# Patient Record
Sex: MALE | Race: WHITE | NOT HISPANIC OR LATINO | Employment: FULL TIME | URBAN - METROPOLITAN AREA
[De-identification: names, ages, dates, MRNs, and addresses within clinical notes are randomized per-mention and may not be internally consistent; named-entity substitution may affect disease eponyms.]

---

## 2023-10-16 ENCOUNTER — LAB (OUTPATIENT)
Dept: LAB | Facility: LAB | Age: 63
End: 2023-10-16
Payer: COMMERCIAL

## 2023-10-16 DIAGNOSIS — I10 ESSENTIAL (PRIMARY) HYPERTENSION: ICD-10-CM

## 2023-10-16 DIAGNOSIS — R73.09 OTHER ABNORMAL GLUCOSE: ICD-10-CM

## 2023-10-16 DIAGNOSIS — Z00.00 ENCOUNTER FOR GENERAL ADULT MEDICAL EXAMINATION WITHOUT ABNORMAL FINDINGS: Primary | ICD-10-CM

## 2023-10-16 DIAGNOSIS — Z12.5 ENCOUNTER FOR SCREENING FOR MALIGNANT NEOPLASM OF PROSTATE: ICD-10-CM

## 2023-10-16 LAB
EST. AVERAGE GLUCOSE BLD GHB EST-MCNC: 151 MG/DL
HBA1C MFR BLD: 6.9 %
PSA SERPL-MCNC: 0.47 NG/ML

## 2023-10-16 PROCEDURE — 84153 ASSAY OF PSA TOTAL: CPT

## 2023-10-16 PROCEDURE — 36415 COLL VENOUS BLD VENIPUNCTURE: CPT

## 2023-10-16 PROCEDURE — 83036 HEMOGLOBIN GLYCOSYLATED A1C: CPT

## 2023-10-17 ENCOUNTER — HOSPITAL ENCOUNTER (OUTPATIENT)
Dept: RADIOLOGY | Facility: HOSPITAL | Age: 63
End: 2023-10-17
Payer: COMMERCIAL

## 2023-10-24 ENCOUNTER — APPOINTMENT (OUTPATIENT)
Dept: VASCULAR MEDICINE | Facility: HOSPITAL | Age: 63
End: 2023-10-24
Payer: COMMERCIAL

## 2023-10-31 ENCOUNTER — APPOINTMENT (OUTPATIENT)
Dept: RADIOLOGY | Facility: HOSPITAL | Age: 63
End: 2023-10-31
Payer: COMMERCIAL

## 2023-11-02 ENCOUNTER — HOSPITAL ENCOUNTER (OUTPATIENT)
Dept: VASCULAR MEDICINE | Facility: HOSPITAL | Age: 63
Discharge: HOME | End: 2023-11-02
Payer: COMMERCIAL

## 2023-11-02 DIAGNOSIS — R60.0 LOCALIZED EDEMA: ICD-10-CM

## 2023-11-02 DIAGNOSIS — I87.2 VENOUS INSUFFICIENCY (CHRONIC) (PERIPHERAL): ICD-10-CM

## 2023-11-02 PROCEDURE — 93971 EXTREMITY STUDY: CPT | Performed by: SURGERY

## 2023-11-02 PROCEDURE — 93971 EXTREMITY STUDY: CPT

## 2024-10-15 ENCOUNTER — LAB (OUTPATIENT)
Dept: LAB | Facility: LAB | Age: 64
End: 2024-10-15
Payer: COMMERCIAL

## 2024-10-15 DIAGNOSIS — R63.4 ABNORMAL WEIGHT LOSS: ICD-10-CM

## 2024-10-15 DIAGNOSIS — R05.3 CHRONIC COUGH: ICD-10-CM

## 2024-10-15 DIAGNOSIS — R10.84 GENERALIZED ABDOMINAL PAIN: ICD-10-CM

## 2024-10-15 LAB
CREAT SERPL-MCNC: 0.71 MG/DL (ref 0.5–1.3)
EGFRCR SERPLBLD CKD-EPI 2021: >90 ML/MIN/1.73M*2

## 2024-10-15 PROCEDURE — 36415 COLL VENOUS BLD VENIPUNCTURE: CPT

## 2024-10-15 PROCEDURE — 82565 ASSAY OF CREATININE: CPT

## 2024-10-16 ENCOUNTER — HOSPITAL ENCOUNTER (OUTPATIENT)
Dept: RADIOLOGY | Facility: HOSPITAL | Age: 64
Discharge: HOME | End: 2024-10-16
Payer: COMMERCIAL

## 2024-10-16 DIAGNOSIS — R63.4 ABNORMAL WEIGHT LOSS: ICD-10-CM

## 2024-10-16 DIAGNOSIS — R05.3 CHRONIC COUGH: ICD-10-CM

## 2024-10-16 DIAGNOSIS — R10.84 GENERALIZED ABDOMINAL PAIN: ICD-10-CM

## 2024-10-16 PROCEDURE — 71260 CT THORAX DX C+: CPT | Performed by: STUDENT IN AN ORGANIZED HEALTH CARE EDUCATION/TRAINING PROGRAM

## 2024-10-16 PROCEDURE — 74177 CT ABD & PELVIS W/CONTRAST: CPT | Performed by: STUDENT IN AN ORGANIZED HEALTH CARE EDUCATION/TRAINING PROGRAM

## 2024-10-16 PROCEDURE — 2550000001 HC RX 255 CONTRASTS

## 2024-10-16 PROCEDURE — 74177 CT ABD & PELVIS W/CONTRAST: CPT

## 2024-11-01 ENCOUNTER — HOSPITAL ENCOUNTER (OUTPATIENT)
Dept: RADIOLOGY | Facility: HOSPITAL | Age: 64
Discharge: HOME | End: 2024-11-01
Payer: COMMERCIAL

## 2024-11-01 ENCOUNTER — LAB (OUTPATIENT)
Dept: LAB | Facility: LAB | Age: 64
End: 2024-11-01
Payer: COMMERCIAL

## 2024-11-01 DIAGNOSIS — R63.4 ABNORMAL WEIGHT LOSS: Primary | ICD-10-CM

## 2024-11-01 DIAGNOSIS — N28.9 DISORDER OF KIDNEY AND URETER, UNSPECIFIED: ICD-10-CM

## 2024-11-01 DIAGNOSIS — R63.4 ABNORMAL WEIGHT LOSS: ICD-10-CM

## 2024-11-01 DIAGNOSIS — R10.84 GENERALIZED ABDOMINAL PAIN: ICD-10-CM

## 2024-11-01 LAB
ALBUMIN SERPL BCP-MCNC: 4 G/DL (ref 3.4–5)
ALP SERPL-CCNC: 51 U/L (ref 33–136)
ALT SERPL W P-5'-P-CCNC: 12 U/L (ref 10–52)
ANION GAP SERPL CALC-SCNC: 12 MMOL/L (ref 10–20)
AST SERPL W P-5'-P-CCNC: 14 U/L (ref 9–39)
BASOPHILS # BLD AUTO: 0.03 X10*3/UL (ref 0–0.1)
BASOPHILS NFR BLD AUTO: 0.4 %
BILIRUB SERPL-MCNC: 1 MG/DL (ref 0–1.2)
BUN SERPL-MCNC: 12 MG/DL (ref 6–23)
CALCIUM SERPL-MCNC: 8.9 MG/DL (ref 8.6–10.3)
CHLORIDE SERPL-SCNC: 103 MMOL/L (ref 98–107)
CO2 SERPL-SCNC: 29 MMOL/L (ref 21–32)
CREAT SERPL-MCNC: 0.69 MG/DL (ref 0.5–1.3)
EGFRCR SERPLBLD CKD-EPI 2021: >90 ML/MIN/1.73M*2
EOSINOPHIL # BLD AUTO: 0.09 X10*3/UL (ref 0–0.7)
EOSINOPHIL NFR BLD AUTO: 1.3 %
ERYTHROCYTE [DISTWIDTH] IN BLOOD BY AUTOMATED COUNT: 14.5 % (ref 11.5–14.5)
EST. AVERAGE GLUCOSE BLD GHB EST-MCNC: 131 MG/DL
GLUCOSE SERPL-MCNC: 118 MG/DL (ref 74–99)
HBA1C MFR BLD: 6.2 %
HCT VFR BLD AUTO: 45.2 % (ref 41–52)
HGB BLD-MCNC: 15 G/DL (ref 13.5–17.5)
IMM GRANULOCYTES # BLD AUTO: 0.02 X10*3/UL (ref 0–0.7)
IMM GRANULOCYTES NFR BLD AUTO: 0.3 % (ref 0–0.9)
LYMPHOCYTES # BLD AUTO: 1.21 X10*3/UL (ref 1.2–4.8)
LYMPHOCYTES NFR BLD AUTO: 17.8 %
MCH RBC QN AUTO: 30.9 PG (ref 26–34)
MCHC RBC AUTO-ENTMCNC: 33.2 G/DL (ref 32–36)
MCV RBC AUTO: 93 FL (ref 80–100)
MONOCYTES # BLD AUTO: 0.72 X10*3/UL (ref 0.1–1)
MONOCYTES NFR BLD AUTO: 10.6 %
NEUTROPHILS # BLD AUTO: 4.72 X10*3/UL (ref 1.2–7.7)
NEUTROPHILS NFR BLD AUTO: 69.6 %
NRBC BLD-RTO: 0 /100 WBCS (ref 0–0)
PLATELET # BLD AUTO: 232 X10*3/UL (ref 150–450)
POTASSIUM SERPL-SCNC: 4.8 MMOL/L (ref 3.5–5.3)
PROT SERPL-MCNC: 6.5 G/DL (ref 6.4–8.2)
RBC # BLD AUTO: 4.86 X10*6/UL (ref 4.5–5.9)
SODIUM SERPL-SCNC: 139 MMOL/L (ref 136–145)
T4 FREE SERPL-MCNC: 0.84 NG/DL (ref 0.61–1.12)
TSH SERPL-ACNC: 2.27 MIU/L (ref 0.44–3.98)
WBC # BLD AUTO: 6.8 X10*3/UL (ref 4.4–11.3)

## 2024-11-01 PROCEDURE — 85025 COMPLETE CBC W/AUTO DIFF WBC: CPT

## 2024-11-01 PROCEDURE — 83036 HEMOGLOBIN GLYCOSYLATED A1C: CPT

## 2024-11-01 PROCEDURE — 84439 ASSAY OF FREE THYROXINE: CPT

## 2024-11-01 PROCEDURE — 84443 ASSAY THYROID STIM HORMONE: CPT

## 2024-11-01 PROCEDURE — 36415 COLL VENOUS BLD VENIPUNCTURE: CPT

## 2024-11-01 PROCEDURE — 80053 COMPREHEN METABOLIC PANEL: CPT

## 2024-11-01 PROCEDURE — 76770 US EXAM ABDO BACK WALL COMP: CPT

## 2024-12-06 ENCOUNTER — HOSPITAL ENCOUNTER (OUTPATIENT)
Dept: RADIOLOGY | Facility: HOSPITAL | Age: 64
Discharge: HOME | End: 2024-12-06
Payer: COMMERCIAL

## 2024-12-06 DIAGNOSIS — N28.9 DISORDER OF KIDNEY AND URETER, UNSPECIFIED: ICD-10-CM

## 2024-12-06 PROCEDURE — A9575 INJ GADOTERATE MEGLUMI 0.1ML: HCPCS

## 2024-12-06 PROCEDURE — 2550000001 HC RX 255 CONTRASTS

## 2024-12-06 PROCEDURE — 74183 MRI ABD W/O CNTR FLWD CNTR: CPT

## 2024-12-06 RX ORDER — GADOTERATE MEGLUMINE 376.9 MG/ML
0.2 INJECTION INTRAVENOUS
Status: COMPLETED | OUTPATIENT
Start: 2024-12-06 | End: 2024-12-06

## 2025-01-13 ENCOUNTER — APPOINTMENT (OUTPATIENT)
Dept: RADIOLOGY | Facility: HOSPITAL | Age: 65
End: 2025-01-13
Payer: COMMERCIAL

## 2025-01-13 ENCOUNTER — APPOINTMENT (OUTPATIENT)
Dept: CARDIOLOGY | Facility: HOSPITAL | Age: 65
End: 2025-01-13
Payer: COMMERCIAL

## 2025-01-13 ENCOUNTER — HOSPITAL ENCOUNTER (INPATIENT)
Facility: HOSPITAL | Age: 65
LOS: 1 days | Discharge: HOME | End: 2025-01-14
Attending: EMERGENCY MEDICINE | Admitting: INTERNAL MEDICINE
Payer: COMMERCIAL

## 2025-01-13 DIAGNOSIS — R41.82 ALTERED MENTAL STATUS, UNSPECIFIED ALTERED MENTAL STATUS TYPE: ICD-10-CM

## 2025-01-13 DIAGNOSIS — G45.8 OTHER TRANSIENT CEREBRAL ISCHEMIC ATTACKS AND RELATED SYNDROMES: ICD-10-CM

## 2025-01-13 DIAGNOSIS — I10 ESSENTIAL (PRIMARY) HYPERTENSION: ICD-10-CM

## 2025-01-13 DIAGNOSIS — I16.1 HYPERTENSIVE EMERGENCY: Primary | ICD-10-CM

## 2025-01-13 PROBLEM — R55 SYNCOPE AND COLLAPSE: Status: ACTIVE | Noted: 2022-02-03

## 2025-01-13 LAB
ALBUMIN SERPL BCP-MCNC: 4.1 G/DL (ref 3.4–5)
ALP SERPL-CCNC: 50 U/L (ref 33–136)
ALT SERPL W P-5'-P-CCNC: 17 U/L (ref 10–52)
AMPHETAMINES UR QL SCN: ABNORMAL
ANION GAP BLDV CALCULATED.4IONS-SCNC: 7 MMOL/L (ref 10–25)
ANION GAP SERPL CALC-SCNC: 12 MMOL/L (ref 10–20)
AORTIC VALVE MEAN GRADIENT: 4 MMHG
AORTIC VALVE PEAK VELOCITY: 1.49 M/S
APAP SERPL-MCNC: <10 UG/ML
APPEARANCE UR: CLEAR
AST SERPL W P-5'-P-CCNC: 22 U/L (ref 9–39)
ATRIAL RATE: 92 BPM
AV PEAK GRADIENT: 9 MMHG
AVA (PEAK VEL): 3.69 CM2
AVA (VTI): 3.53 CM2
BARBITURATES UR QL SCN: ABNORMAL
BASE EXCESS BLDV CALC-SCNC: 4.9 MMOL/L (ref -2–3)
BASOPHILS # BLD AUTO: 0.01 X10*3/UL (ref 0–0.1)
BASOPHILS NFR BLD AUTO: 0.2 %
BENZODIAZ UR QL SCN: ABNORMAL
BILIRUB DIRECT SERPL-MCNC: 0.2 MG/DL (ref 0–0.3)
BILIRUB SERPL-MCNC: 0.8 MG/DL (ref 0–1.2)
BILIRUB UR STRIP.AUTO-MCNC: NEGATIVE MG/DL
BODY TEMPERATURE: 37 DEGREES CELSIUS
BUN SERPL-MCNC: 12 MG/DL (ref 6–23)
BZE UR QL SCN: ABNORMAL
CA-I BLDV-SCNC: 1.16 MMOL/L (ref 1.1–1.33)
CALCIUM SERPL-MCNC: 8.9 MG/DL (ref 8.6–10.3)
CANNABINOIDS UR QL SCN: ABNORMAL
CARDIAC TROPONIN I PNL SERPL HS: 13 NG/L (ref 0–20)
CARDIAC TROPONIN I PNL SERPL HS: 16 NG/L (ref 0–20)
CHLORIDE BLDV-SCNC: 104 MMOL/L (ref 98–107)
CHLORIDE SERPL-SCNC: 102 MMOL/L (ref 98–107)
CO2 SERPL-SCNC: 28 MMOL/L (ref 21–32)
COLOR UR: ABNORMAL
CREAT SERPL-MCNC: 0.84 MG/DL (ref 0.5–1.3)
EGFRCR SERPLBLD CKD-EPI 2021: >90 ML/MIN/1.73M*2
EJECTION FRACTION APICAL 4 CHAMBER: 62.8
EJECTION FRACTION: 63 %
EOSINOPHIL # BLD AUTO: 0.02 X10*3/UL (ref 0–0.7)
EOSINOPHIL NFR BLD AUTO: 0.4 %
ERYTHROCYTE [DISTWIDTH] IN BLOOD BY AUTOMATED COUNT: 14.5 % (ref 11.5–14.5)
ETHANOL SERPL-MCNC: <10 MG/DL
FENTANYL+NORFENTANYL UR QL SCN: ABNORMAL
FLUAV RNA RESP QL NAA+PROBE: DETECTED
FLUBV RNA RESP QL NAA+PROBE: NOT DETECTED
GLUCOSE BLDV-MCNC: 160 MG/DL (ref 74–99)
GLUCOSE SERPL-MCNC: 202 MG/DL (ref 74–99)
GLUCOSE UR STRIP.AUTO-MCNC: ABNORMAL MG/DL
HCO3 BLDV-SCNC: 28.1 MMOL/L (ref 22–26)
HCT VFR BLD AUTO: 43.1 % (ref 41–52)
HCT VFR BLD EST: 44 % (ref 41–52)
HGB BLD-MCNC: 14.9 G/DL (ref 13.5–17.5)
HGB BLDV-MCNC: 14.5 G/DL (ref 13.5–17.5)
HOLD SPECIMEN: NORMAL
IMM GRANULOCYTES # BLD AUTO: 0.01 X10*3/UL (ref 0–0.7)
IMM GRANULOCYTES NFR BLD AUTO: 0.2 % (ref 0–0.9)
INHALED O2 CONCENTRATION: 21 %
KETONES UR STRIP.AUTO-MCNC: ABNORMAL MG/DL
LACTATE BLDV-SCNC: 1.1 MMOL/L (ref 0.4–2)
LEFT ATRIUM VOLUME AREA LENGTH INDEX BSA: 26.7 ML/M2
LEFT VENTRICLE INTERNAL DIMENSION DIASTOLE: 4.57 CM (ref 3.5–6)
LEFT VENTRICULAR OUTFLOW TRACT DIAMETER: 2.15 CM
LEUKOCYTE ESTERASE UR QL STRIP.AUTO: NEGATIVE
LV EJECTION FRACTION BIPLANE: 63 %
LYMPHOCYTES # BLD AUTO: 0.59 X10*3/UL (ref 1.2–4.8)
LYMPHOCYTES NFR BLD AUTO: 11.1 %
MAGNESIUM SERPL-MCNC: 1.83 MG/DL (ref 1.6–2.4)
MCH RBC QN AUTO: 31.4 PG (ref 26–34)
MCHC RBC AUTO-ENTMCNC: 34.6 G/DL (ref 32–36)
MCV RBC AUTO: 91 FL (ref 80–100)
METHADONE UR QL SCN: ABNORMAL
MITRAL VALVE E/A RATIO: 0.97
MONOCYTES # BLD AUTO: 0.75 X10*3/UL (ref 0.1–1)
MONOCYTES NFR BLD AUTO: 14.1 %
MUCOUS THREADS #/AREA URNS AUTO: ABNORMAL /LPF
NEUTROPHILS # BLD AUTO: 3.93 X10*3/UL (ref 1.2–7.7)
NEUTROPHILS NFR BLD AUTO: 74 %
NITRITE UR QL STRIP.AUTO: NEGATIVE
NRBC BLD-RTO: 0 /100 WBCS (ref 0–0)
OPIATES UR QL SCN: ABNORMAL
OXYCODONE+OXYMORPHONE UR QL SCN: ABNORMAL
OXYHGB MFR BLDV: 54.2 % (ref 45–75)
P AXIS: 76 DEGREES
PCO2 BLDV: 36 MM HG (ref 41–51)
PCP UR QL SCN: ABNORMAL
PH BLDV: 7.5 PH (ref 7.33–7.43)
PH UR STRIP.AUTO: 8 [PH]
PLATELET # BLD AUTO: 179 X10*3/UL (ref 150–450)
PO2 BLDV: 32 MM HG (ref 35–45)
POTASSIUM BLDV-SCNC: 3.8 MMOL/L (ref 3.5–5.3)
POTASSIUM SERPL-SCNC: 4.3 MMOL/L (ref 3.5–5.3)
PR INTERVAL: 183 MS
PROT SERPL-MCNC: 6.4 G/DL (ref 6.4–8.2)
PROT UR STRIP.AUTO-MCNC: ABNORMAL MG/DL
Q ONSET: 253 MS
QRS COUNT: 15 BEATS
QRS DURATION: 147 MS
QT INTERVAL: 343 MS
QTC CALCULATION(BAZETT): 425 MS
QTC FREDERICIA: 395 MS
R AXIS: 122 DEGREES
RBC # BLD AUTO: 4.75 X10*6/UL (ref 4.5–5.9)
RBC # UR STRIP.AUTO: NEGATIVE /UL
RBC #/AREA URNS AUTO: ABNORMAL /HPF
RIGHT VENTRICLE FREE WALL PEAK S': 16.28 CM/S
RIGHT VENTRICLE PEAK SYSTOLIC PRESSURE: 20 MMHG
RSV RNA RESP QL NAA+PROBE: NOT DETECTED
SALICYLATES SERPL-MCNC: <3 MG/DL
SAO2 % BLDV: 57 % (ref 45–75)
SARS-COV-2 RNA RESP QL NAA+PROBE: NOT DETECTED
SODIUM BLDV-SCNC: 135 MMOL/L (ref 136–145)
SODIUM SERPL-SCNC: 138 MMOL/L (ref 136–145)
SP GR UR STRIP.AUTO: 1.01
T AXIS: 8 DEGREES
T OFFSET: 424 MS
TRICUSPID ANNULAR PLANE SYSTOLIC EXCURSION: 2.5 CM
UROBILINOGEN UR STRIP.AUTO-MCNC: NORMAL MG/DL
VENTRICULAR RATE: 92 BPM
WBC # BLD AUTO: 5.3 X10*3/UL (ref 4.4–11.3)
WBC #/AREA URNS AUTO: ABNORMAL /HPF

## 2025-01-13 PROCEDURE — 70450 CT HEAD/BRAIN W/O DYE: CPT | Performed by: RADIOLOGY

## 2025-01-13 PROCEDURE — 71045 X-RAY EXAM CHEST 1 VIEW: CPT | Performed by: RADIOLOGY

## 2025-01-13 PROCEDURE — 87637 SARSCOV2&INF A&B&RSV AMP PRB: CPT

## 2025-01-13 PROCEDURE — 82248 BILIRUBIN DIRECT: CPT | Performed by: EMERGENCY MEDICINE

## 2025-01-13 PROCEDURE — 1200000002 HC GENERAL ROOM WITH TELEMETRY DAILY

## 2025-01-13 PROCEDURE — 84484 ASSAY OF TROPONIN QUANT: CPT | Performed by: EMERGENCY MEDICINE

## 2025-01-13 PROCEDURE — 80320 DRUG SCREEN QUANTALCOHOLS: CPT | Performed by: EMERGENCY MEDICINE

## 2025-01-13 PROCEDURE — 80053 COMPREHEN METABOLIC PANEL: CPT | Performed by: EMERGENCY MEDICINE

## 2025-01-13 PROCEDURE — 84132 ASSAY OF SERUM POTASSIUM: CPT | Performed by: EMERGENCY MEDICINE

## 2025-01-13 PROCEDURE — 93306 TTE W/DOPPLER COMPLETE: CPT

## 2025-01-13 PROCEDURE — 2500000004 HC RX 250 GENERAL PHARMACY W/ HCPCS (ALT 636 FOR OP/ED): Performed by: EMERGENCY MEDICINE

## 2025-01-13 PROCEDURE — 2500000002 HC RX 250 W HCPCS SELF ADMINISTERED DRUGS (ALT 637 FOR MEDICARE OP, ALT 636 FOR OP/ED)

## 2025-01-13 PROCEDURE — 36415 COLL VENOUS BLD VENIPUNCTURE: CPT | Performed by: EMERGENCY MEDICINE

## 2025-01-13 PROCEDURE — 80307 DRUG TEST PRSMV CHEM ANLYZR: CPT | Performed by: EMERGENCY MEDICINE

## 2025-01-13 PROCEDURE — 96374 THER/PROPH/DIAG INJ IV PUSH: CPT | Mod: 59

## 2025-01-13 PROCEDURE — 93306 TTE W/DOPPLER COMPLETE: CPT | Performed by: INTERNAL MEDICINE

## 2025-01-13 PROCEDURE — 70551 MRI BRAIN STEM W/O DYE: CPT

## 2025-01-13 PROCEDURE — 85025 COMPLETE CBC W/AUTO DIFF WBC: CPT | Performed by: EMERGENCY MEDICINE

## 2025-01-13 PROCEDURE — 96372 THER/PROPH/DIAG INJ SC/IM: CPT

## 2025-01-13 PROCEDURE — 96372 THER/PROPH/DIAG INJ SC/IM: CPT | Performed by: EMERGENCY MEDICINE

## 2025-01-13 PROCEDURE — 2500000004 HC RX 250 GENERAL PHARMACY W/ HCPCS (ALT 636 FOR OP/ED)

## 2025-01-13 PROCEDURE — 99223 1ST HOSP IP/OBS HIGH 75: CPT

## 2025-01-13 PROCEDURE — 83735 ASSAY OF MAGNESIUM: CPT | Performed by: EMERGENCY MEDICINE

## 2025-01-13 PROCEDURE — 70551 MRI BRAIN STEM W/O DYE: CPT | Performed by: RADIOLOGY

## 2025-01-13 PROCEDURE — 2500000001 HC RX 250 WO HCPCS SELF ADMINISTERED DRUGS (ALT 637 FOR MEDICARE OP)

## 2025-01-13 PROCEDURE — 99285 EMERGENCY DEPT VISIT HI MDM: CPT | Mod: 25 | Performed by: EMERGENCY MEDICINE

## 2025-01-13 PROCEDURE — 81001 URINALYSIS AUTO W/SCOPE: CPT | Performed by: EMERGENCY MEDICINE

## 2025-01-13 PROCEDURE — 70450 CT HEAD/BRAIN W/O DYE: CPT

## 2025-01-13 PROCEDURE — 93005 ELECTROCARDIOGRAM TRACING: CPT

## 2025-01-13 PROCEDURE — 71045 X-RAY EXAM CHEST 1 VIEW: CPT

## 2025-01-13 RX ORDER — VALSARTAN 320 MG/1
320 TABLET ORAL DAILY
COMMUNITY
Start: 2024-09-15

## 2025-01-13 RX ORDER — ESCITALOPRAM OXALATE 20 MG/1
20 TABLET ORAL DAILY
COMMUNITY
Start: 2024-09-15

## 2025-01-13 RX ORDER — LABETALOL HYDROCHLORIDE 5 MG/ML
20 INJECTION, SOLUTION INTRAVENOUS ONCE
Status: COMPLETED | OUTPATIENT
Start: 2025-01-13 | End: 2025-01-13

## 2025-01-13 RX ORDER — POLYETHYLENE GLYCOL 3350 17 G/17G
17 POWDER, FOR SOLUTION ORAL DAILY PRN
Status: DISCONTINUED | OUTPATIENT
Start: 2025-01-13 | End: 2025-01-14 | Stop reason: HOSPADM

## 2025-01-13 RX ORDER — PANTOPRAZOLE SODIUM 40 MG/1
40 TABLET, DELAYED RELEASE ORAL
Status: DISCONTINUED | OUTPATIENT
Start: 2025-01-14 | End: 2025-01-14 | Stop reason: HOSPADM

## 2025-01-13 RX ORDER — HYDROXYZINE 50 MG/ML
25 INJECTION, SOLUTION INTRAMUSCULAR ONCE
Status: COMPLETED | OUTPATIENT
Start: 2025-01-13 | End: 2025-01-13

## 2025-01-13 RX ORDER — GUAIFENESIN 600 MG/1
600 TABLET, EXTENDED RELEASE ORAL EVERY 12 HOURS PRN
Status: DISCONTINUED | OUTPATIENT
Start: 2025-01-13 | End: 2025-01-14 | Stop reason: HOSPADM

## 2025-01-13 RX ORDER — DILTIAZEM HYDROCHLORIDE 120 MG/1
120 CAPSULE, EXTENDED RELEASE ORAL DAILY
COMMUNITY
Start: 2024-09-15 | End: 2025-01-14 | Stop reason: HOSPADM

## 2025-01-13 RX ORDER — ACETAMINOPHEN 325 MG/1
650 TABLET ORAL EVERY 4 HOURS PRN
Status: DISCONTINUED | OUTPATIENT
Start: 2025-01-13 | End: 2025-01-14 | Stop reason: HOSPADM

## 2025-01-13 RX ORDER — LABETALOL HYDROCHLORIDE 5 MG/ML
20 INJECTION, SOLUTION INTRAVENOUS ONCE
Status: DISCONTINUED | OUTPATIENT
Start: 2025-01-13 | End: 2025-01-13

## 2025-01-13 RX ORDER — VALSARTAN 320 MG/1
320 TABLET ORAL DAILY
Status: DISCONTINUED | OUTPATIENT
Start: 2025-01-13 | End: 2025-01-14 | Stop reason: HOSPADM

## 2025-01-13 RX ORDER — METOPROLOL SUCCINATE 50 MG/1
50 TABLET, EXTENDED RELEASE ORAL DAILY
COMMUNITY
Start: 2024-09-15 | End: 2025-01-19 | Stop reason: HOSPADM

## 2025-01-13 RX ORDER — ONDANSETRON HYDROCHLORIDE 2 MG/ML
4 INJECTION, SOLUTION INTRAVENOUS EVERY 6 HOURS PRN
Status: DISCONTINUED | OUTPATIENT
Start: 2025-01-13 | End: 2025-01-14 | Stop reason: HOSPADM

## 2025-01-13 RX ORDER — TALC
3 POWDER (GRAM) TOPICAL NIGHTLY PRN
Status: DISCONTINUED | OUTPATIENT
Start: 2025-01-13 | End: 2025-01-14 | Stop reason: HOSPADM

## 2025-01-13 RX ORDER — PANTOPRAZOLE SODIUM 40 MG/10ML
40 INJECTION, POWDER, LYOPHILIZED, FOR SOLUTION INTRAVENOUS
Status: DISCONTINUED | OUTPATIENT
Start: 2025-01-14 | End: 2025-01-14 | Stop reason: HOSPADM

## 2025-01-13 RX ORDER — HYDRALAZINE HYDROCHLORIDE 25 MG/1
25 TABLET, FILM COATED ORAL 3 TIMES DAILY PRN
Status: DISCONTINUED | OUTPATIENT
Start: 2025-01-13 | End: 2025-01-14 | Stop reason: HOSPADM

## 2025-01-13 RX ORDER — METOPROLOL SUCCINATE 50 MG/1
50 TABLET, EXTENDED RELEASE ORAL DAILY
Status: DISCONTINUED | OUTPATIENT
Start: 2025-01-13 | End: 2025-01-14 | Stop reason: HOSPADM

## 2025-01-13 RX ORDER — ESCITALOPRAM OXALATE 10 MG/1
20 TABLET ORAL DAILY
Status: DISCONTINUED | OUTPATIENT
Start: 2025-01-13 | End: 2025-01-13

## 2025-01-13 RX ORDER — DILTIAZEM HYDROCHLORIDE 120 MG/1
120 CAPSULE, COATED, EXTENDED RELEASE ORAL DAILY
Status: DISCONTINUED | OUTPATIENT
Start: 2025-01-13 | End: 2025-01-14 | Stop reason: HOSPADM

## 2025-01-13 RX ORDER — ENOXAPARIN SODIUM 100 MG/ML
40 INJECTION SUBCUTANEOUS EVERY 24 HOURS
Status: DISCONTINUED | OUTPATIENT
Start: 2025-01-13 | End: 2025-01-14 | Stop reason: HOSPADM

## 2025-01-13 RX ADMIN — VALSARTAN 320 MG: 80 TABLET, FILM COATED ORAL at 15:43

## 2025-01-13 RX ADMIN — ENOXAPARIN SODIUM 40 MG: 40 INJECTION SUBCUTANEOUS at 14:44

## 2025-01-13 RX ADMIN — HYDROXYZINE HYDROCHLORIDE 25 MG: 50 INJECTION, SOLUTION INTRAMUSCULAR at 10:29

## 2025-01-13 RX ADMIN — METOPROLOL SUCCINATE 50 MG: 50 TABLET, EXTENDED RELEASE ORAL at 14:43

## 2025-01-13 RX ADMIN — LABETALOL HYDROCHLORIDE 20 MG: 5 INJECTION INTRAVENOUS at 10:16

## 2025-01-13 SDOH — SOCIAL STABILITY: SOCIAL INSECURITY: HAVE YOU HAD THOUGHTS OF HARMING ANYONE ELSE?: NO

## 2025-01-13 SDOH — SOCIAL STABILITY: SOCIAL INSECURITY: WERE YOU ABLE TO COMPLETE ALL THE BEHAVIORAL HEALTH SCREENINGS?: YES

## 2025-01-13 SDOH — SOCIAL STABILITY: SOCIAL INSECURITY: ABUSE: ADULT

## 2025-01-13 ASSESSMENT — ENCOUNTER SYMPTOMS
FATIGUE: 0
CHILLS: 0
JOINT SWELLING: 0
VOMITING: 0
DIAPHORESIS: 1
FLANK PAIN: 0
WOUND: 0
COUGH: 0
FEVER: 0
CONSTIPATION: 0
DIZZINESS: 1
CHEST TIGHTNESS: 0
DIARRHEA: 0
PALPITATIONS: 0
SHORTNESS OF BREATH: 0
ABDOMINAL PAIN: 0
WEAKNESS: 0
NUMBNESS: 1
BACK PAIN: 0
HEADACHES: 0
TREMORS: 0
WHEEZING: 0
HEMATURIA: 0
NAUSEA: 0

## 2025-01-13 ASSESSMENT — COGNITIVE AND FUNCTIONAL STATUS - GENERAL
DAILY ACTIVITIY SCORE: 24
MOBILITY SCORE: 24
DAILY ACTIVITIY SCORE: 24
PATIENT BASELINE BEDBOUND: NO
MOBILITY SCORE: 24

## 2025-01-13 ASSESSMENT — PATIENT HEALTH QUESTIONNAIRE - PHQ9
2. FEELING DOWN, DEPRESSED OR HOPELESS: NOT AT ALL
1. LITTLE INTEREST OR PLEASURE IN DOING THINGS: NOT AT ALL
SUM OF ALL RESPONSES TO PHQ9 QUESTIONS 1 & 2: 0

## 2025-01-13 ASSESSMENT — COLUMBIA-SUICIDE SEVERITY RATING SCALE - C-SSRS
1. IN THE PAST MONTH, HAVE YOU WISHED YOU WERE DEAD OR WISHED YOU COULD GO TO SLEEP AND NOT WAKE UP?: NO
2. HAVE YOU ACTUALLY HAD ANY THOUGHTS OF KILLING YOURSELF?: NO
6. HAVE YOU EVER DONE ANYTHING, STARTED TO DO ANYTHING, OR PREPARED TO DO ANYTHING TO END YOUR LIFE?: NO

## 2025-01-13 ASSESSMENT — LIFESTYLE VARIABLES
HAVE PEOPLE ANNOYED YOU BY CRITICIZING YOUR DRINKING: YES
EVER HAD A DRINK FIRST THING IN THE MORNING TO STEADY YOUR NERVES TO GET RID OF A HANGOVER: NO
EVER FELT BAD OR GUILTY ABOUT YOUR DRINKING: YES
HOW OFTEN DO YOU HAVE 6 OR MORE DRINKS ON ONE OCCASION: NEVER
HOW MANY STANDARD DRINKS CONTAINING ALCOHOL DO YOU HAVE ON A TYPICAL DAY: 1 OR 2
AUDIT-C TOTAL SCORE: 2
HOW OFTEN DO YOU HAVE A DRINK CONTAINING ALCOHOL: 2-4 TIMES A MONTH
HAVE YOU EVER FELT YOU SHOULD CUT DOWN ON YOUR DRINKING: NO
AUDIT-C TOTAL SCORE: 2
SKIP TO QUESTIONS 9-10: 1
TOTAL SCORE: 2

## 2025-01-13 ASSESSMENT — ACTIVITIES OF DAILY LIVING (ADL)
GROOMING: INDEPENDENT
BATHING: INDEPENDENT
JUDGMENT_ADEQUATE_SAFELY_COMPLETE_DAILY_ACTIVITIES: YES
ADEQUATE_TO_COMPLETE_ADL: YES
PATIENT'S MEMORY ADEQUATE TO SAFELY COMPLETE DAILY ACTIVITIES?: YES
FEEDING YOURSELF: INDEPENDENT
HEARING - RIGHT EAR: FUNCTIONAL
LACK_OF_TRANSPORTATION: NO
HEARING - LEFT EAR: FUNCTIONAL
TOILETING: INDEPENDENT
WALKS IN HOME: INDEPENDENT
DRESSING YOURSELF: INDEPENDENT

## 2025-01-13 ASSESSMENT — PAIN SCALES - GENERAL
PAINLEVEL_OUTOF10: 0 - NO PAIN
PAINLEVEL_OUTOF10: 0 - NO PAIN

## 2025-01-13 ASSESSMENT — PAIN - FUNCTIONAL ASSESSMENT: PAIN_FUNCTIONAL_ASSESSMENT: 0-10

## 2025-01-13 NOTE — H&P
"[facility] - GENERAL MEDICINE HISTORY AND PHYSICAL    History Obtained From (Primary Source): patient, daughters  Collateral History (Secondary Sources): D/w ED provider, chart review    History Of Present Illness (HPI):  Cheng De Jesus \"Luther\" is a 64 y.o. male with PMHx s/f HTN presenting with dizziness. He states he woke up this morning with numbness/tingling in his right hand and lips. He woke up drenched in sweat. He did his normal morning routine but while making coffee, his felt really dizzy with the room spinning and near syncope. He doesn't remember how he got to the hospital. His mom is recently hospitalized for Influenza A. His daughter states he became really confused and kept repeating he has to see his mom at the hospital. He couldn't recognize his daughters. He also reports he hasn't been taking any prescribed medications, especially his blood pressure medications because he \"feels funny and doesn't seem right after taking them\". He saw his PCP a couple months ago but didn't bring up about stopping the medications on his own term. He also has been trying out marijuana for past 2 months for insomnia and vaped it every night. Denies n/v/d, chest pain, sob, abd pain, leg swelling, syncope.     ED Course (Summary - please note all labs, imaging studies, and interventions noted below have been personally reviewed and/or interpreted on day of admission):   Vitals on presentation: 98.1 F, 95 bpm, 20 RR, 177/108, 99% on RA  Labs: CMP-glucose 2 2  CBC, UA largely unremarkable  VBG-pH 7.50, pCO2 36, pO2 32, HCO3 28.1  EKG: Sinus rhythm at 92 bpm, RBBB and LPFB  Imaging: CT head and CXR negative  Interventions: Hydroxyzine 25 mg, labetalol 20 mg, admission for further management    12-point ROS reviewed and found to be negative aside from aforementioned positives in HPI and/or noted in dedicated ROS section below.     ED Course (From ED Provider):  Diagnoses as of 01/13/25 1340   Hypertensive " emergency   Altered mental status, unspecified altered mental status type   Essential (primary) hypertension     Relevant Results  Results for orders placed or performed during the hospital encounter of 01/13/25 (from the past 24 hours)   ECG 12 lead   Result Value Ref Range    Ventricular Rate 92 BPM    Atrial Rate 92 BPM    LA Interval 183 ms    QRS Duration 147 ms    QT Interval 343 ms    QTC Calculation(Bazett) 425 ms    P Axis 76 degrees    R Axis 122 degrees    T Axis 8 degrees    QRS Count 15 beats    Q Onset 253 ms    T Offset 424 ms    QTC Fredericia 395 ms   CBC and Auto Differential   Result Value Ref Range    WBC 5.3 4.4 - 11.3 x10*3/uL    nRBC 0.0 0.0 - 0.0 /100 WBCs    RBC 4.75 4.50 - 5.90 x10*6/uL    Hemoglobin 14.9 13.5 - 17.5 g/dL    Hematocrit 43.1 41.0 - 52.0 %    MCV 91 80 - 100 fL    MCH 31.4 26.0 - 34.0 pg    MCHC 34.6 32.0 - 36.0 g/dL    RDW 14.5 11.5 - 14.5 %    Platelets 179 150 - 450 x10*3/uL    Neutrophils % 74.0 40.0 - 80.0 %    Immature Granulocytes %, Automated 0.2 0.0 - 0.9 %    Lymphocytes % 11.1 13.0 - 44.0 %    Monocytes % 14.1 2.0 - 10.0 %    Eosinophils % 0.4 0.0 - 6.0 %    Basophils % 0.2 0.0 - 2.0 %    Neutrophils Absolute 3.93 1.20 - 7.70 x10*3/uL    Immature Granulocytes Absolute, Automated 0.01 0.00 - 0.70 x10*3/uL    Lymphocytes Absolute 0.59 (L) 1.20 - 4.80 x10*3/uL    Monocytes Absolute 0.75 0.10 - 1.00 x10*3/uL    Eosinophils Absolute 0.02 0.00 - 0.70 x10*3/uL    Basophils Absolute 0.01 0.00 - 0.10 x10*3/uL   Basic metabolic panel   Result Value Ref Range    Glucose 202 (H) 74 - 99 mg/dL    Sodium 138 136 - 145 mmol/L    Potassium 4.3 3.5 - 5.3 mmol/L    Chloride 102 98 - 107 mmol/L    Bicarbonate 28 21 - 32 mmol/L    Anion Gap 12 10 - 20 mmol/L    Urea Nitrogen 12 6 - 23 mg/dL    Creatinine 0.84 0.50 - 1.30 mg/dL    eGFR >90 >60 mL/min/1.73m*2    Calcium 8.9 8.6 - 10.3 mg/dL   Magnesium   Result Value Ref Range    Magnesium 1.83 1.60 - 2.40 mg/dL   Hepatic function panel    Result Value Ref Range    Albumin 4.1 3.4 - 5.0 g/dL    Bilirubin, Total 0.8 0.0 - 1.2 mg/dL    Bilirubin, Direct 0.2 0.0 - 0.3 mg/dL    Alkaline Phosphatase 50 33 - 136 U/L    ALT 17 10 - 52 U/L    AST 22 9 - 39 U/L    Total Protein 6.4 6.4 - 8.2 g/dL   Acute Toxicology Panel, Blood   Result Value Ref Range    Acetaminophen <10.0 10.0 - 30.0 ug/mL    Salicylate  <3 4 - 20 mg/dL    Alcohol <10 <=10 mg/dL   Troponin I, High Sensitivity, Initial   Result Value Ref Range    Troponin I, High Sensitivity 13 0 - 20 ng/L   Troponin, High Sensitivity, 1 Hour   Result Value Ref Range    Troponin I, High Sensitivity 16 0 - 20 ng/L   Blood Gas Venous Full Panel   Result Value Ref Range    POCT pH, Venous 7.50 (H) 7.33 - 7.43 pH    POCT pCO2, Venous 36 (L) 41 - 51 mm Hg    POCT pO2, Venous 32 (L) 35 - 45 mm Hg    POCT SO2, Venous 57 45 - 75 %    POCT Oxy Hemoglobin, Venous 54.2 45.0 - 75.0 %    POCT Hematocrit Calculated, Venous 44.0 41.0 - 52.0 %    POCT Sodium, Venous 135 (L) 136 - 145 mmol/L    POCT Potassium, Venous 3.8 3.5 - 5.3 mmol/L    POCT Chloride, Venous 104 98 - 107 mmol/L    POCT Ionized Calicum, Venous 1.16 1.10 - 1.33 mmol/L    POCT Glucose, Venous 160 (H) 74 - 99 mg/dL    POCT Lactate, Venous 1.1 0.4 - 2.0 mmol/L    POCT Base Excess, Venous 4.9 (H) -2.0 - 3.0 mmol/L    POCT HCO3 Calculated, Venous 28.1 (H) 22.0 - 26.0 mmol/L    POCT Hemoglobin, Venous 14.5 13.5 - 17.5 g/dL    POCT Anion Gap, Venous 7.0 (L) 10.0 - 25.0 mmol/L    Patient Temperature 37.0 degrees Celsius    FiO2 21 %   Urinalysis with Reflex Culture and Microscopic   Result Value Ref Range    Color, Urine Light-Yellow Light-Yellow, Yellow, Dark-Yellow    Appearance, Urine Clear Clear    Specific Gravity, Urine 1.011 1.005 - 1.035    pH, Urine 8.0 5.0, 5.5, 6.0, 6.5, 7.0, 7.5, 8.0    Protein, Urine 50 (1+) (A) NEGATIVE, 10 (TRACE), 20 (TRACE) mg/dL    Glucose, Urine 50 (TRACE) (A) Normal mg/dL    Blood, Urine NEGATIVE NEGATIVE    Ketones, Urine 20  (1+) (A) NEGATIVE mg/dL    Bilirubin, Urine NEGATIVE NEGATIVE    Urobilinogen, Urine Normal Normal mg/dL    Nitrite, Urine NEGATIVE NEGATIVE    Leukocyte Esterase, Urine NEGATIVE NEGATIVE   Drug Screen, Urine   Result Value Ref Range    Amphetamine Screen, Urine Presumptive Negative Presumptive Negative    Barbiturate Screen, Urine Presumptive Negative Presumptive Negative    Benzodiazepines Screen, Urine Presumptive Negative Presumptive Negative    Cannabinoid Screen, Urine Presumptive Positive (A) Presumptive Negative    Cocaine Metabolite Screen, Urine Presumptive Negative Presumptive Negative    Fentanyl Screen, Urine Presumptive Negative Presumptive Negative    Opiate Screen, Urine Presumptive Negative Presumptive Negative    Oxycodone Screen, Urine Presumptive Negative Presumptive Negative    PCP Screen, Urine Presumptive Negative Presumptive Negative    Methadone Screen, Urine Presumptive Negative Presumptive Negative   Urinalysis Microscopic   Result Value Ref Range    WBC, Urine 6-10 (A) 1-5, NONE /HPF    RBC, Urine 1-2 NONE, 1-2, 3-5 /HPF    Mucus, Urine FEW Reference range not established. /LPF      XR chest 1 view    Result Date: 1/13/2025  Interpreted By:  Schoenberger, Joseph, STUDY: XR CHEST 1 VIEW;  1/13/2025 9:54 am   INDICATION: Signs/Symptoms:confusion.     COMPARISON: 02/02/2022   ACCESSION NUMBER(S): [id number]   ORDERING CLINICIAN: OTILIO DUNN   FINDINGS:         CARDIOMEDIASTINAL SILHOUETTE: Cardiac silhouette is normal in size. No venous congestion. There is tortuosity and possibly some ectasia of the thoracic aorta though this is unchanged from prior.   LUNGS: No new focal lung opacity.   ABDOMEN: No remarkable upper abdominal findings.   BONES: No acute osseous changes.       1.  Stable chest       MACRO: None   Signed by: Joseph Schoenberger 1/13/2025 9:56 AM Dictation workstation:   [id number]    ECG 12 lead    Result Date: 1/13/2025  Sinus rhythm RBBB and LPFB    CT head wo IV  contrast    Result Date: 1/13/2025  Interpreted By:  Schoenberger, Joseph, STUDY: CT HEAD WO IV CONTRAST;  1/13/2025 9:43 am   INDICATION: Signs/Symptoms:dizzy confusion.     COMPARISON: None.   ACCESSION NUMBER(S): [id number]   ORDERING CLINICIAN: OTILIO DUNN   TECHNIQUE: Noncontrast axial CT scan of head was performed. Angled reformats in brain and bone windows were generated. The images were reviewed in bone, brain, blood and soft tissue windows.   FINDINGS: CSF Spaces: The ventricles, sulci and basal cisterns are within normal limits. There is no extraaxial fluid collection.   Parenchyma:  The grey-white differentiation is intact. There is no mass effect or midline shift.  There is no intracranial hemorrhage.   Calvarium: The calvarium is unremarkable.   Paranasal sinuses and mastoids: Visualized paranasal sinuses and mastoids are clear.       No evidence of acute cortical infarct or intracranial hemorrhage.   No evidence of intracranial hemorrhage or displaced skull fracture.   MACRO: None   Signed by: Joseph Schoenberger 1/13/2025 9:47 AM Dictation workstation:   [id number]    Scheduled medications:  dilTIAZem CD, 120 mg, oral, Daily  enoxaparin, 40 mg, subcutaneous, q24h  metoprolol succinate XL, 50 mg, oral, Daily  [START ON 1/14/2025] pantoprazole, 40 mg, oral, Daily before breakfast   Or  [START ON 1/14/2025] pantoprazole, 40 mg, intravenous, Daily before breakfast  valsartan, 320 mg, oral, Daily      Continuous medications:     PRN medications:  PRN medications: acetaminophen, guaiFENesin, hydrALAZINE, melatonin, ondansetron, polyethylene glycol     Past Medical History  He has a past medical history of Cancer (Multi) and Hypertension.    He has no past medical history of CHF (congestive heart failure), Diabetes mellitus (Multi), or Renal insufficiency.    Surgical History  He has no past surgical history on file.     Social History  He reports that he has been smoking cigarettes. He has never used  smokeless tobacco. He reports current alcohol use. He reports that he does not use drugs.    Family History  No family history on file.    Allergies  Morphine and Valium [diazepam]    Code Status  Full Code     Review of Systems   Constitutional:  Positive for diaphoresis. Negative for chills, fatigue and fever.   Respiratory:  Negative for cough, chest tightness, shortness of breath and wheezing.    Cardiovascular:  Negative for chest pain, palpitations and leg swelling.   Gastrointestinal:  Negative for abdominal pain, constipation, diarrhea, nausea and vomiting.   Genitourinary:  Negative for flank pain and hematuria.   Musculoskeletal:  Negative for back pain and joint swelling.   Skin:  Negative for rash and wound.   Neurological:  Positive for dizziness and numbness. Negative for tremors, syncope, weakness and headaches.       Last Recorded Vitals  BP (!) 165/101   Pulse 66   Temp 36.7 °C (98.1 °F) (Temporal)   Resp 16   Wt 86.2 kg (190 lb)   SpO2 99%      Physical Exam:  Vital signs and nursing notes reviewed.   Constitutional: Pleasant and cooperative. Laying in bed in no acute distress. Conversant.   Skin: Warm and dry; no obvious lesions, rashes, pallor, or jaundice.   Eyes: EOMI. Anicteric sclera.   ENT: Mucous membranes moist; no obvious injury or deformity appreciated.   Head and Neck: Normocephalic, atraumatic. ROM preserved. Trachea midline. No appreciable JVD.   Respiratory: Nonlabored on RA. Lungs clear to auscultation bilaterally without obvious adventitious sounds. Chest rise is equal.  Cardiovascular: RRR. No gross murmur, gallop, or rub. Extremities are warm and well-perfused with good capillary refill (< 3 seconds). No chest wall tenderness.   GI: Abdomen soft, nontender, nondistended. No obvious organomegaly appreciated. Bowel sounds are present.  : No CVA tenderness.   MSK: No gross abnormalities appreciated. No limitations to AROM/PROM appreciated.   Extremities: No cyanosis, edema,  or clubbing evident. Neurovascularly intact.   Neuro: A&Ox3.  Numbness in right hand, diplopia with each test of motility exam.  No gaze palsy.  Finger-to-nose, heel to shin intact, no facial droop noted.  No obvious dysarthria or aphasia noted. Strength 5/5 in bilateral UE, LE and no motor drift noted.  Psych: Appropriate mood and behavior.    Assessment/Plan     64 y.o. male with PMHx s/f HTN presenting with dizziness.     Transient altered mental status  -ddx: hypertensive encephalopathy, posterior circulation stroke, viral illness, arrhythmias, cannabis use disorder  -CTH, CXR, UA negative  -MRI brain wo and TTE ordered  -viral studies pending  -continue tele monitoring    Hypertensive Emergency  -rec'd Hydroxyzine 25 mg, labetalol 20 mg in ED  -restarted him on home valsartan 320 mg and Toprol-XL 50 mg  -holding Cardizem and likely can resume it tomorrow for slow reduction of BP     Diet: Cardiac  DVT Prophylaxis: Lovenox SQ  Code Status: Full code  Case Discussed With: ED provider  Additional Sources Reviewed:     Anticipated Length of Stay (LOS): Patient will require 1-2 midnights for dizziness/AMS workup      Jennifer Garces PA-C    Dragon dictation software was used to dictate this note and thus there may be minor errors in translation/transcription including garbled speech or misspellings. Please contact for clarification if needed.

## 2025-01-13 NOTE — ED PROVIDER NOTES
HPI   Chief Complaint   Patient presents with    Dizziness     Pt c/o dizziness, jitteriness, nausea, whole body tingling (especially hands and mouth) since this morning. Recently exposed to flu A. Pt denies vomiting, diarrhea, or fevers.       HPI: []  64-year-old white male history of hypertension on escitalopram comes in with altered mental status.  Patient has been taking his mother who was in the hospital for influenza for the last few days the last event to his house this morning got up he was feeling dizzy and jittery and he was brought to ED for evaluation..  On arrival to the ED he also became confused.  He has no headache no double vision blurred vision loss of vision no chest pressure having fever chills nausea vomit diarrhea cough congestion incontinence seizures syncope or near syncope, no history of stroke or TIA in the past but denies taking blood thinners.  No history of tobacco use alcohol abuse.    Past history: Hypertension  Social: Patient denies a current tobacco alcohol drug abuse.  REVIEW OF SYSTEMS:    GENERAL.: No weight loss, fatigue, anorexia, insomnia, fever.  Positive dizziness    EYES: No vision loss, double vision, drainage, eye pain.    ENT: No pharyngitis, dry mouth.    CARDIOPULMONARY: No chest pain, palpitations, syncope, near syncope. No shortness of breath, cough, hemoptysis.    GI: No abdominal pain, change in bowel habits, melena, hematemesis, hematochezia, nausea, vomiting, diarrhea.    : No discharge, dysuria, frequency, urgency, hematuria.    MS: No limb pain, joint pain, joint swelling.  Neuro: Positive confusion  SKIN: No rashes.    PSYCH: No depression, anxiety, suicidality, homicidality.    Review of systems is otherwise negative unless stated above or in history of present illness.  Social history, family history, allergies reviewed.  PHYSICAL EXAM:    GENERAL: Vitals noted, no distress. Alert and oriented  x 3. Non-toxic.  Tearful anxious    EENT: TMs clear.  Posterior oropharynx unremarkable. No meningismus. No LAD.     NECK: Supple. Nontender. No midline tenderness.     CARDIAC: Regular, rate, rhythm. No murmurs rubs or gallops. No JVD    PULMONARY: Lungs clear bilaterally with good aeration. No wheezes rales or rhonchi. No respiratory distress.     ABDOMEN: Soft, nonsurgical. Nontender. No peritoneal signs. Normoactive bowel sounds. No pulsatile masses.     EXTREMITIES: No peripheral edema. Negative Homans bilaterally, no cords.  2+ bounding pulses well-perfused    SKIN: No rash. Intact.     NEURO: No focal neurologic deficits, NIH score of 0. Cranial nerves normal as tested from II through XII.     MEDICAL DECISION MAKING:  EKG on my interpretation shows a normal sinus rhythm normal axis rate mid 60s with no acute ischemic changes.  CBC with differential chemistry LFTs are unremarkable troponin and negative CT head negative chest x-ray negative.  Urine drug screen positive for cannabis urinalysis negative for UTI.  Venous full panel was done which showed respiratory alkalosis    Treatment in the ED: On arrival IV IV established placed on a cardiac monitor given IV labetalol for blood pressure control and intramuscular hydroxyzine for anxiety    ED course: Repeat assessment he is still anxious feels better still somewhat confused but he has no aphasia or dysarthria and no focal neurodeficits any strokes remains 0.    Impression: #1 altered mental status, #2 hypertensive urgency, #3 anxiety    Plan/MDM: 64-year-old white male history of hypertension comes in with altered mental status, upon arrival he is neurologic intact stroke scale is 0 patient does not warrant TNK and/or thrombectomy.  He is Van negative.  No concern for stroke TIA infection dehydration sepsis septic shock, etiology symptoms unclear could be hypertensive emergency could be anxiety could be cannabis induced anxiety, patient blood pressure downtrending with patient will be hospital for further  evaluation medical MRI of the brain and further consultation in the hospital.                  Patient History   Past Medical History:   Diagnosis Date    Cancer (Multi)     about 8 years ago, salivary gland CA    Hypertension      History reviewed. No pertinent surgical history.  No family history on file.  Social History     Tobacco Use    Smoking status: Every Day     Current packs/day: 0.50     Types: Cigarettes    Smokeless tobacco: Never   Substance Use Topics    Alcohol use: Yes     Comment: weekends    Drug use: Never       Physical Exam   ED Triage Vitals   Temperature Heart Rate Respirations BP   01/13/25 0858 01/13/25 0858 01/13/25 0858 01/13/25 0858   36.7 °C (98.1 °F) 95 20 (!) 177/108      Pulse Ox Temp Source Heart Rate Source Patient Position   01/13/25 0858 01/13/25 0858 01/13/25 1230 --   99 % Temporal Monitor       BP Location FiO2 (%)     -- --             Physical Exam      ED Course & MDM   Diagnoses as of 01/13/25 1533   Hypertensive emergency   Altered mental status, unspecified altered mental status type   Essential (primary) hypertension                 No data recorded     [place] Coma Scale Score: 14 (01/13/25 0935 : Sherri Lorenzo, TALI)                           Medical Decision Making      Procedure  Procedures     Mahesh Emery MD  01/13/25 1536

## 2025-01-14 VITALS
WEIGHT: 190 LBS | TEMPERATURE: 98.9 F | SYSTOLIC BLOOD PRESSURE: 150 MMHG | HEIGHT: 74 IN | HEART RATE: 59 BPM | BODY MASS INDEX: 24.38 KG/M2 | RESPIRATION RATE: 16 BRPM | OXYGEN SATURATION: 96 % | DIASTOLIC BLOOD PRESSURE: 82 MMHG

## 2025-01-14 LAB
ALBUMIN SERPL BCP-MCNC: 3.6 G/DL (ref 3.4–5)
ALP SERPL-CCNC: 43 U/L (ref 33–136)
ALT SERPL W P-5'-P-CCNC: 17 U/L (ref 10–52)
ANION GAP SERPL CALC-SCNC: 10 MMOL/L (ref 10–20)
AST SERPL W P-5'-P-CCNC: 19 U/L (ref 9–39)
BILIRUB SERPL-MCNC: 0.5 MG/DL (ref 0–1.2)
BUN SERPL-MCNC: 11 MG/DL (ref 6–23)
CALCIUM SERPL-MCNC: 8.3 MG/DL (ref 8.6–10.3)
CHLORIDE SERPL-SCNC: 104 MMOL/L (ref 98–107)
CO2 SERPL-SCNC: 29 MMOL/L (ref 21–32)
CREAT SERPL-MCNC: 0.6 MG/DL (ref 0.5–1.3)
EGFRCR SERPLBLD CKD-EPI 2021: >90 ML/MIN/1.73M*2
ERYTHROCYTE [DISTWIDTH] IN BLOOD BY AUTOMATED COUNT: 14.6 % (ref 11.5–14.5)
GLUCOSE SERPL-MCNC: 104 MG/DL (ref 74–99)
HCT VFR BLD AUTO: 40.8 % (ref 41–52)
HGB BLD-MCNC: 13.6 G/DL (ref 13.5–17.5)
MCH RBC QN AUTO: 30.5 PG (ref 26–34)
MCHC RBC AUTO-ENTMCNC: 33.3 G/DL (ref 32–36)
MCV RBC AUTO: 92 FL (ref 80–100)
NRBC BLD-RTO: 0 /100 WBCS (ref 0–0)
PLATELET # BLD AUTO: 166 X10*3/UL (ref 150–450)
POTASSIUM SERPL-SCNC: 3.7 MMOL/L (ref 3.5–5.3)
PROT SERPL-MCNC: 5.9 G/DL (ref 6.4–8.2)
RBC # BLD AUTO: 4.46 X10*6/UL (ref 4.5–5.9)
SODIUM SERPL-SCNC: 139 MMOL/L (ref 136–145)
WBC # BLD AUTO: 4 X10*3/UL (ref 4.4–11.3)

## 2025-01-14 PROCEDURE — 2500000001 HC RX 250 WO HCPCS SELF ADMINISTERED DRUGS (ALT 637 FOR MEDICARE OP)

## 2025-01-14 PROCEDURE — 99239 HOSP IP/OBS DSCHRG MGMT >30: CPT | Performed by: INTERNAL MEDICINE

## 2025-01-14 PROCEDURE — 85027 COMPLETE CBC AUTOMATED: CPT

## 2025-01-14 PROCEDURE — 83036 HEMOGLOBIN GLYCOSYLATED A1C: CPT | Mod: PORLAB | Performed by: NURSE PRACTITIONER

## 2025-01-14 PROCEDURE — 36415 COLL VENOUS BLD VENIPUNCTURE: CPT

## 2025-01-14 PROCEDURE — 80053 COMPREHEN METABOLIC PANEL: CPT

## 2025-01-14 RX ADMIN — METOPROLOL SUCCINATE 50 MG: 50 TABLET, EXTENDED RELEASE ORAL at 09:02

## 2025-01-14 RX ADMIN — PANTOPRAZOLE SODIUM 40 MG: 40 TABLET, DELAYED RELEASE ORAL at 06:38

## 2025-01-14 RX ADMIN — VALSARTAN 320 MG: 80 TABLET, FILM COATED ORAL at 09:02

## 2025-01-14 SDOH — ECONOMIC STABILITY: FOOD INSECURITY: WITHIN THE PAST 12 MONTHS, YOU WORRIED THAT YOUR FOOD WOULD RUN OUT BEFORE YOU GOT THE MONEY TO BUY MORE.: NEVER TRUE

## 2025-01-14 SDOH — ECONOMIC STABILITY: TRANSPORTATION INSECURITY: IN THE PAST 12 MONTHS, HAS LACK OF TRANSPORTATION KEPT YOU FROM MEDICAL APPOINTMENTS OR FROM GETTING MEDICATIONS?: NO

## 2025-01-14 SDOH — SOCIAL STABILITY: SOCIAL INSECURITY
WITHIN THE LAST YEAR, HAVE YOU BEEN RAPED OR FORCED TO HAVE ANY KIND OF SEXUAL ACTIVITY BY YOUR PARTNER OR EX-PARTNER?: NO

## 2025-01-14 SDOH — ECONOMIC STABILITY: FOOD INSECURITY: HOW HARD IS IT FOR YOU TO PAY FOR THE VERY BASICS LIKE FOOD, HOUSING, MEDICAL CARE, AND HEATING?: NOT VERY HARD

## 2025-01-14 SDOH — SOCIAL STABILITY: SOCIAL INSECURITY
WITHIN THE LAST YEAR, HAVE YOU BEEN KICKED, HIT, SLAPPED, OR OTHERWISE PHYSICALLY HURT BY YOUR PARTNER OR EX-PARTNER?: NO

## 2025-01-14 SDOH — ECONOMIC STABILITY: HOUSING INSECURITY: IN THE LAST 12 MONTHS, WAS THERE A TIME WHEN YOU WERE NOT ABLE TO PAY THE MORTGAGE OR RENT ON TIME?: NO

## 2025-01-14 SDOH — SOCIAL STABILITY: SOCIAL INSECURITY: WITHIN THE LAST YEAR, HAVE YOU BEEN AFRAID OF YOUR PARTNER OR EX-PARTNER?: NO

## 2025-01-14 SDOH — ECONOMIC STABILITY: FOOD INSECURITY: WITHIN THE PAST 12 MONTHS, THE FOOD YOU BOUGHT JUST DIDN'T LAST AND YOU DIDN'T HAVE MONEY TO GET MORE.: NEVER TRUE

## 2025-01-14 SDOH — ECONOMIC STABILITY: INCOME INSECURITY: IN THE PAST 12 MONTHS HAS THE ELECTRIC, GAS, OIL, OR WATER COMPANY THREATENED TO SHUT OFF SERVICES IN YOUR HOME?: NO

## 2025-01-14 SDOH — ECONOMIC STABILITY: HOUSING INSECURITY: AT ANY TIME IN THE PAST 12 MONTHS, WERE YOU HOMELESS OR LIVING IN A SHELTER (INCLUDING NOW)?: NO

## 2025-01-14 SDOH — ECONOMIC STABILITY: HOUSING INSECURITY: IN THE PAST 12 MONTHS, HOW MANY TIMES HAVE YOU MOVED WHERE YOU WERE LIVING?: 0

## 2025-01-14 SDOH — SOCIAL STABILITY: SOCIAL INSECURITY: WITHIN THE LAST YEAR, HAVE YOU BEEN HUMILIATED OR EMOTIONALLY ABUSED IN OTHER WAYS BY YOUR PARTNER OR EX-PARTNER?: NO

## 2025-01-14 ASSESSMENT — COGNITIVE AND FUNCTIONAL STATUS - GENERAL
MOBILITY SCORE: 24
DAILY ACTIVITIY SCORE: 24

## 2025-01-14 ASSESSMENT — ACTIVITIES OF DAILY LIVING (ADL)
LACK_OF_TRANSPORTATION: NO
LACK_OF_TRANSPORTATION: NO

## 2025-01-14 NOTE — PROGRESS NOTES
01/14/25 1246   Discharge Planning   Living Arrangements Spouse/significant other   Support Systems Spouse/significant other   Assistance Needed Independent   Type of Residence Private residence   Home or Post Acute Services None   Expected Discharge Disposition Home   Does the patient need discharge transport arranged? No   Financial Resource Strain   How hard is it for you to pay for the very basics like food, housing, medical care, and heating? Not hard   Housing Stability   In the last 12 months, was there a time when you were not able to pay the mortgage or rent on time? N   At any time in the past 12 months, were you homeless or living in a shelter (including now)? N   Transportation Needs   In the past 12 months, has lack of transportation kept you from medical appointments or from getting medications? no   In the past 12 months, has lack of transportation kept you from meetings, work, or from getting things needed for daily living? No     PCP is Glory oRyal MD. Patient is from home with his wife. Patient is independent with ambulation, self care, driving, shopping, and meals.  Patient prefers to return home with no needs upon discharge. [facility] will continue to follow for needs if they arise.

## 2025-01-14 NOTE — DISCHARGE SUMMARY
"Discharge Diagnosis  Hypertensive emergency    Issues Requiring Follow-Up  Follow up with PCP in a week. Take blood pressure medications at home.     Discharge Meds     Medication List      CONTINUE taking these medications     escitalopram 20 mg tablet; Commonly known as: Lexapro   metoprolol succinate XL 50 mg 24 hr tablet; Commonly known as: Toprol-XL   valsartan 320 mg tablet; Commonly known as: Diovan     STOP taking these medications     dilTIAZem  mg 24 hr capsule; Commonly known as: Dilacor XR       Test Results Pending At Discharge  Pending Labs       No current pending labs.            Hospital Course   Cheng De Jesus \"Elle" is a 64 y.o. male with PMHx s/f HTN presenting with dizziness.  His daughter states he became really confused and kept repeating he has to see his mom at the hospital. The pt stated that he had stop taking all his medications as he did not like how they made him feel. HE said he was on three different BP medications. Admission CT head and CXR negative. BP was significantly high on admission. No chest pain cough or shortness of breath. Tested positive for Flu.  MRI of the brain no acute intracranial abnormality but did show chronic changes and may represent microangiopathic disease.  The patient's mental status is at his baseline alert and oriented.  Ambulating without any difficulty denied any focal neurologic deficits.  The patient is requesting discharge he states he wants to see his mom before he goes home.  The patient is otherwise stable for discharge with outpatient follow-up.  The patient was counseled regarding taking his medications especially blood pressure medications and the patient stated understanding.  Patient was counseled if he gets worse to return to the hospital.    Pertinent Physical Exam At Time of Discharge  Physical Exam  Constitutional:       Appearance: Normal appearance.   HENT:      Head: Normocephalic and atraumatic.      Nose: Nose normal.      " Mouth/Throat:      Mouth: Mucous membranes are dry.   Eyes:      Extraocular Movements: Extraocular movements intact.      Conjunctiva/sclera: Conjunctivae normal.   Cardiovascular:      Rate and Rhythm: Normal rate and regular rhythm.      Pulses: Normal pulses.      Heart sounds: Normal heart sounds.   Pulmonary:      Effort: Pulmonary effort is normal.      Breath sounds: Normal breath sounds.   Abdominal:      General: Bowel sounds are normal.      Palpations: Abdomen is soft.   Musculoskeletal:         General: Normal range of motion.      Cervical back: Normal range of motion and neck supple.   Skin:     General: Skin is warm and dry.   Neurological:      General: No focal deficit present.      Mental Status: He is alert and oriented to person, place, and time. Mental status is at baseline.   Psychiatric:         Mood and Affect: Mood normal.         Outpatient Follow-Up  No future appointments.      Mahesh Joshi MD

## 2025-01-14 NOTE — PROGRESS NOTES
"Occupational Therapy Discharge/Screen                 Therapy Communication Note    Patient Name: Cheng De Jesus \"Luther\"  MRN: [id number]  Department: [facility] 2 E  Room: [address] [place], [place] [address]  Today's Date: 1/14/2025     Discipline: Occupational Therapy    OT Missed Visit: Yes     Missed Visit Reason: Missed Visit Reason: Other (Comment) (OT consult received and chart reviewed. Upon arrival, pt up in bathroom with family in room. Pt demonstrated toeilting LB dressing and transfers independently. Denies concerns for home going. Pt appears at baseline and no acute/home OT needs)    Missed Time: Screen      "

## 2025-01-14 NOTE — PROGRESS NOTES
"Physical Therapy                 Therapy Communication Note    Patient Name: Cheng De Jesus \"Luther\"  MRN: [id number]  Department: [address]  Room: [address] [place] [address]  Today's Date: 1/14/2025     Discipline: Physical Therapy          Missed Visit Reason: Missed Visit Reason:  (SCREEN DC, PT. IN BATHROOM INDEP UPON ARRIVAL TO ROOM, SPOUSEIN ROOM, PT. INDEP W/ TOILETINGADNAMB INROOM NO LOB. DIZZINESS, SLIGHT SOB W/ ACTIVITY, PT. REPORTS NO MOBILTIY CONCERNSON DISCH, FAMILY AGREES, BASELINE MOBILTY NOTED, NO REHAB NEEDS)    Missed Time: Attempt    Comment:  "

## 2025-01-14 NOTE — CARE PLAN
The patient's goals for the shift include      The clinical goals for the shift include Maintain safety and neuro checks throughout the night reporting any changes immediately.

## 2025-01-14 NOTE — CONSULTS
Nutrition Initial Assessment:   Nutrition Assessment    Reason for Assessment: Admission nursing screening    Medical history per chart:  64 y.o. male with PMHx s/f HTN presenting with dizziness.     1/14/25: Spoke with pt and visitor at bedside this date. Pt endorses good appetite, but reported decreased ability to eat due to getting new upper denture 3 months ago which causes pain during eating and has limited intake (baseline intake 100% of 3 daily meals, current intake 50% of 3 daily meals). For breakfast, has been drinking V8 and a generic protein shake. Content of other meals unclear. Reported eating 100% of dinner last night. Denied swallowing issues. Endorsed muscle loss with wt loss. Pt has not followed up with dental provider regarding painful upper dentures--encouraged follow-up.     Current Diet: Adult diet Cardiac; 70 gm fat; 2 - 3 grams Sodium  Supplement(s): No  Average meal Intake during admission:  75% of one documented meal    Average supplement intake during admission: none ordered at this time     Nutrition Related Findings:   Oral Symptoms: chewing  Teeth: Dentures upper     Food allergies: NKFA. is allergic to lisinopril, morphine, and valium [diazepam].  Meds/Labs reviewed.  [Held by provider] dilTIAZem CD, 120 mg, oral, Daily  enoxaparin, 40 mg, subcutaneous, q24h  metoprolol succinate XL, 50 mg, oral, Daily  pantoprazole, 40 mg, oral, Daily before breakfast   Or  pantoprazole, 40 mg, intravenous, Daily before breakfast  valsartan, 320 mg, oral, Daily             Nutrition Significant Labs:    Results from last 7 days   Lab Units 01/14/25  0525 01/13/25  0930   GLUCOSE mg/dL 104* 202*   SODIUM mmol/L 139 138   POTASSIUM mmol/L 3.7 4.3   CHLORIDE mmol/L 104 102   CO2 mmol/L 29 28   BUN mg/dL 11 12   CREATININE mg/dL 0.60 0.84   EGFR mL/min/1.73m*2 >90 >90   CALCIUM mg/dL 8.3* 8.9   MAGNESIUM mg/dL  --  1.83     Lab Results   Component Value Date    HGBA1C 6.2 (H) 11/01/2024    HGBA1C 6.9 (H)  "10/16/2023           Anthropometrics:  Height: 188 cm (6' 2\")   Weight: 86.2 kg (190 lb)   BMI (Calculated): 24.38  IBW/kg (Dietitian Calculated): 86.4 kg       Weight History:   Wt Readings from Last 10 Encounters:   01/13/25 86.2 kg (190 lb)   12/06/24 90.7 kg (200 lb)   02/05/20 112 kg (247 lb 12.8 oz)        Weight Change %:  Weight History / % Weight Change: Pt reports unintentional wt loss of 67# over 3 months. -250#; pt confirms current wt of 190# on file. Limited documented wt hx available for review; was 90.7 kg on 11/15/24; represents trend of 5% loss x 2 months.  Significant Weight Loss: No          Nutrition Focused Physical Exam Findings:  NFPE conducted on 1/14/25.  Subcutaneous Fat Loss:   Orbital Fat Pads: Mild-Moderate (slight dark circles and slight hollowing)  Buccal Fat Pads: Defer (pt wearing mask during assessment)  Triceps: Well nourished (ample fat tissue)  Muscle Wasting:  Temporalis: Mild-Moderate (slight depression)  Pectoralis (Clavicular Region): Mild-Moderate (some protrusion of clavicle)  Deltoid/Trapezius: Mild-Moderate (slight protrusion of acromion process)  Interosseous: Well nourished (muscle bulges)  Quadriceps: Mild-Moderate (mild depression on inner and outer thigh)  Gastrocnemius: Well nourished (well developed bulbous muscle)  Edema:  Edema: none  Physical Findings:  Skin: Negative    Estimated Needs:   Total Energy Estimated Needs (kCal): 2245 kCal  Method for Estimating Needs: [place] St Jeor x 1.3  Total Protein Estimated Needs (g): 86 g  Method for Estimating Needs: 1 g/kg  Total Fluid Estimated Needs (mL): 2245 mL  Method for Estimating Needs: 1 mL/kcal or per provider        Nutrition Diagnosis   Nutrition Diagnosis:  Malnutrition Diagnosis  Patient has Malnutrition Diagnosis:  (unable to determine at this time)    Nutrition Diagnosis  Patient has Nutrition Diagnosis: Yes  Diagnosis Status (1): New  Nutrition Diagnosis 1: Unintended weight loss  Related to (1): " decreased PO intake secondary to new upper denture causing pain with eating  As Evidenced by (1): trend of 5% wt loss documented over 2 months (90.7 kg on 11/15/24; 86.2 kg on 1/13/24), MST score upon admission  Additional Assessment Information (1): noted muscle wasting present upon physical exam       Nutrition Interventions/Recommendations   Nutrition Interventions and Recommendations:        Nutrition Prescription:  Individualized Nutrition Prescription Provided for : Supplements        Nutrition Interventions:   Food and/or Nutrient Delivery Interventions  Interventions: Medical food supplement  Medical Food Supplement: Commercial beverage  Goal: Recommend providing Ensure HP Plus BID to encourage adequate PO intake.  Consider beginning multivitamin with minerals to ensure adequate provision of micronutrients due to reported wt loss and poor PO intake prior to admission.   Encouraged pt to follow up with dental provider regarding painful dentures.    Nutrition Education:   Education Documentation  No documentation found.      Nutrition Counseling  Counseling Theoretical Approach: Other (Comment) (Discussed ONS with pt and visitor at bedside, discussed ordering food items pt knows he can tolerate chewing)       Nutrition Monitoring and Evaluation   Monitoring/Evaluation:   Food/Nutrient Related History Monitoring  Monitoring and Evaluation Plan: Amount of food  Amount of Food: Estimated amout of food  Criteria: PO intake >75%    Body Composition/Growth/Weight History  Monitoring and Evaluation Plan: Weight change  Weight Change: Weight change percentage  Criteria: Maintain stable weight    Biochemical Data, Medical Tests and Procedures  Monitoring and Evaluation Plan: Electrolyte/renal panel, Glucose/endocrine profile  Electrolyte and Renal Panel: BUN, Creatinine, Phosphorus, Magnesium, Potassium, Sodium  Criteria: WDL  Glucose/Endocrine Profile: Glucose, casual  Criteria: WDL    Nutrition Focused Physical  Findings  Monitoring and Evaluation Plan: Skin, Adipose, Muscles  Adipose: Loss of subcutaneous fat  Criteria: Monitor for further loss  Muscles: Muscle atrophy  Criteria: Monitor for further wasting  Skin: Other (Comment)  Criteria: Skin will be without breakdown during stay.            Time Spent/Follow-up Reminder:   Follow Up  Time Spent (min): 45 minutes  Nutrition Follow-Up Needed?: Dietitian to reassess per policy  Follow up Comment: 1/17-1/19

## 2025-01-15 ENCOUNTER — APPOINTMENT (OUTPATIENT)
Dept: RADIOLOGY | Facility: HOSPITAL | Age: 65
DRG: 640 | End: 2025-01-15
Payer: COMMERCIAL

## 2025-01-15 ENCOUNTER — HOSPITAL ENCOUNTER (INPATIENT)
Facility: HOSPITAL | Age: 65
LOS: 3 days | Discharge: HOME | DRG: 640 | End: 2025-01-19
Attending: EMERGENCY MEDICINE | Admitting: INTERNAL MEDICINE
Payer: COMMERCIAL

## 2025-01-15 ENCOUNTER — APPOINTMENT (OUTPATIENT)
Dept: CARDIOLOGY | Facility: HOSPITAL | Age: 65
DRG: 640 | End: 2025-01-15
Payer: COMMERCIAL

## 2025-01-15 DIAGNOSIS — G45.9 TIA (TRANSIENT ISCHEMIC ATTACK): ICD-10-CM

## 2025-01-15 DIAGNOSIS — E87.6 HYPOKALEMIA: ICD-10-CM

## 2025-01-15 DIAGNOSIS — E53.8 B12 DEFICIENCY: ICD-10-CM

## 2025-01-15 DIAGNOSIS — R41.82 ALTERED MENTAL STATUS, UNSPECIFIED ALTERED MENTAL STATUS TYPE: Primary | ICD-10-CM

## 2025-01-15 DIAGNOSIS — E83.51 HYPOCALCEMIA: ICD-10-CM

## 2025-01-15 DIAGNOSIS — I16.0 HYPERTENSIVE URGENCY: ICD-10-CM

## 2025-01-15 LAB
ALBUMIN SERPL BCP-MCNC: 2.4 G/DL (ref 3.4–5)
ALP SERPL-CCNC: 30 U/L (ref 33–136)
ALT SERPL W P-5'-P-CCNC: 11 U/L (ref 10–52)
AMPHETAMINES UR QL SCN: ABNORMAL
ANION GAP SERPL CALC-SCNC: 8 MMOL/L (ref 10–20)
APAP SERPL-MCNC: <10 UG/ML
APPEARANCE UR: CLEAR
AST SERPL W P-5'-P-CCNC: 12 U/L (ref 9–39)
ATRIAL RATE: 58 BPM
BARBITURATES UR QL SCN: ABNORMAL
BASOPHILS # BLD AUTO: 0.01 X10*3/UL (ref 0–0.1)
BASOPHILS NFR BLD AUTO: 0.2 %
BENZODIAZ UR QL SCN: ABNORMAL
BILIRUB SERPL-MCNC: 0.3 MG/DL (ref 0–1.2)
BILIRUB UR STRIP.AUTO-MCNC: NEGATIVE MG/DL
BNP SERPL-MCNC: 58 PG/ML (ref 0–99)
BUN SERPL-MCNC: 8 MG/DL (ref 6–23)
BZE UR QL SCN: ABNORMAL
CALCIUM SERPL-MCNC: 5.5 MG/DL (ref 8.6–10.3)
CANNABINOIDS UR QL SCN: ABNORMAL
CHLORIDE SERPL-SCNC: 119 MMOL/L (ref 98–107)
CHOLEST SERPL-MCNC: 83 MG/DL (ref 0–199)
CHOLESTEROL/HDL RATIO: 2.7
CO2 SERPL-SCNC: 20 MMOL/L (ref 21–32)
COLOR UR: COLORLESS
CREAT SERPL-MCNC: 0.48 MG/DL (ref 0.5–1.3)
EGFRCR SERPLBLD CKD-EPI 2021: >90 ML/MIN/1.73M*2
EOSINOPHIL # BLD AUTO: 0.02 X10*3/UL (ref 0–0.7)
EOSINOPHIL NFR BLD AUTO: 0.4 %
ERYTHROCYTE [DISTWIDTH] IN BLOOD BY AUTOMATED COUNT: 14.3 % (ref 11.5–14.5)
ETHANOL SERPL-MCNC: <10 MG/DL
FENTANYL+NORFENTANYL UR QL SCN: ABNORMAL
FOLATE SERPL-MCNC: 12.9 NG/ML
GLUCOSE BLD MANUAL STRIP-MCNC: 131 MG/DL (ref 74–99)
GLUCOSE BLD MANUAL STRIP-MCNC: 180 MG/DL (ref 74–99)
GLUCOSE SERPL-MCNC: 103 MG/DL (ref 74–99)
GLUCOSE UR STRIP.AUTO-MCNC: NORMAL MG/DL
HCT VFR BLD AUTO: 42.2 % (ref 41–52)
HDLC SERPL-MCNC: 31.3 MG/DL
HGB BLD-MCNC: 14 G/DL (ref 13.5–17.5)
IMM GRANULOCYTES # BLD AUTO: 0.02 X10*3/UL (ref 0–0.7)
IMM GRANULOCYTES NFR BLD AUTO: 0.4 % (ref 0–0.9)
KETONES UR STRIP.AUTO-MCNC: NEGATIVE MG/DL
LDLC SERPL CALC-MCNC: 39 MG/DL
LEUKOCYTE ESTERASE UR QL STRIP.AUTO: NEGATIVE
LYMPHOCYTES # BLD AUTO: 1.05 X10*3/UL (ref 1.2–4.8)
LYMPHOCYTES NFR BLD AUTO: 23.6 %
MCH RBC QN AUTO: 30.3 PG (ref 26–34)
MCHC RBC AUTO-ENTMCNC: 33.2 G/DL (ref 32–36)
MCV RBC AUTO: 91 FL (ref 80–100)
METHADONE UR QL SCN: ABNORMAL
MONOCYTES # BLD AUTO: 0.65 X10*3/UL (ref 0.1–1)
MONOCYTES NFR BLD AUTO: 14.6 %
NEUTROPHILS # BLD AUTO: 2.7 X10*3/UL (ref 1.2–7.7)
NEUTROPHILS NFR BLD AUTO: 60.8 %
NITRITE UR QL STRIP.AUTO: NEGATIVE
NON HDL CHOLESTEROL: 52 MG/DL (ref 0–149)
NRBC BLD-RTO: 0 /100 WBCS (ref 0–0)
OPIATES UR QL SCN: ABNORMAL
OXYCODONE+OXYMORPHONE UR QL SCN: ABNORMAL
P AXIS: 58 DEGREES
PCP UR QL SCN: ABNORMAL
PH UR STRIP.AUTO: 7.5 [PH]
PLATELET # BLD AUTO: 166 X10*3/UL (ref 150–450)
POTASSIUM SERPL-SCNC: 2.9 MMOL/L (ref 3.5–5.3)
PR INTERVAL: 195 MS
PROT SERPL-MCNC: 3.8 G/DL (ref 6.4–8.2)
PROT UR STRIP.AUTO-MCNC: NEGATIVE MG/DL
Q ONSET: 249 MS
QRS COUNT: 9 BEATS
QRS DURATION: 144 MS
QT INTERVAL: 423 MS
QTC CALCULATION(BAZETT): 419 MS
QTC FREDERICIA: 420 MS
R AXIS: 85 DEGREES
RBC # BLD AUTO: 4.62 X10*6/UL (ref 4.5–5.9)
RBC # UR STRIP.AUTO: NEGATIVE /UL
SALICYLATES SERPL-MCNC: <3 MG/DL
SODIUM SERPL-SCNC: 144 MMOL/L (ref 136–145)
SP GR UR STRIP.AUTO: 1.01
T AXIS: 62 DEGREES
T OFFSET: 461 MS
TRIGL SERPL-MCNC: 65 MG/DL (ref 0–149)
TSH SERPL-ACNC: 0.71 MIU/L (ref 0.44–3.98)
UROBILINOGEN UR STRIP.AUTO-MCNC: NORMAL MG/DL
VENTRICULAR RATE: 59 BPM
VIT B12 SERPL-MCNC: 155 PG/ML (ref 211–911)
VLDL: 13 MG/DL (ref 0–40)
WBC # BLD AUTO: 4.5 X10*3/UL (ref 4.4–11.3)

## 2025-01-15 PROCEDURE — 80320 DRUG SCREEN QUANTALCOHOLS: CPT | Performed by: EMERGENCY MEDICINE

## 2025-01-15 PROCEDURE — 82746 ASSAY OF FOLIC ACID SERUM: CPT | Performed by: NURSE PRACTITIONER

## 2025-01-15 PROCEDURE — 99285 EMERGENCY DEPT VISIT HI MDM: CPT | Mod: 25 | Performed by: EMERGENCY MEDICINE

## 2025-01-15 PROCEDURE — G0427 INPT/ED TELECONSULT70: HCPCS | Performed by: PSYCHIATRY & NEUROLOGY

## 2025-01-15 PROCEDURE — 70547 MR ANGIOGRAPHY NECK W/O DYE: CPT | Performed by: RADIOLOGY

## 2025-01-15 PROCEDURE — 99223 1ST HOSP IP/OBS HIGH 75: CPT

## 2025-01-15 PROCEDURE — 2500000002 HC RX 250 W HCPCS SELF ADMINISTERED DRUGS (ALT 637 FOR MEDICARE OP, ALT 636 FOR OP/ED)

## 2025-01-15 PROCEDURE — 80307 DRUG TEST PRSMV CHEM ANLYZR: CPT | Performed by: EMERGENCY MEDICINE

## 2025-01-15 PROCEDURE — 70450 CT HEAD/BRAIN W/O DYE: CPT | Performed by: RADIOLOGY

## 2025-01-15 PROCEDURE — 93005 ELECTROCARDIOGRAM TRACING: CPT

## 2025-01-15 PROCEDURE — 82947 ASSAY GLUCOSE BLOOD QUANT: CPT

## 2025-01-15 PROCEDURE — 2500000001 HC RX 250 WO HCPCS SELF ADMINISTERED DRUGS (ALT 637 FOR MEDICARE OP): Performed by: EMERGENCY MEDICINE

## 2025-01-15 PROCEDURE — 36415 COLL VENOUS BLD VENIPUNCTURE: CPT | Performed by: EMERGENCY MEDICINE

## 2025-01-15 PROCEDURE — 2500000002 HC RX 250 W HCPCS SELF ADMINISTERED DRUGS (ALT 637 FOR MEDICARE OP, ALT 636 FOR OP/ED): Performed by: EMERGENCY MEDICINE

## 2025-01-15 PROCEDURE — 96375 TX/PRO/DX INJ NEW DRUG ADDON: CPT

## 2025-01-15 PROCEDURE — 96372 THER/PROPH/DIAG INJ SC/IM: CPT | Performed by: EMERGENCY MEDICINE

## 2025-01-15 PROCEDURE — 71045 X-RAY EXAM CHEST 1 VIEW: CPT

## 2025-01-15 PROCEDURE — 83880 ASSAY OF NATRIURETIC PEPTIDE: CPT | Performed by: NURSE PRACTITIONER

## 2025-01-15 PROCEDURE — 80053 COMPREHEN METABOLIC PANEL: CPT | Performed by: EMERGENCY MEDICINE

## 2025-01-15 PROCEDURE — 83921 ORGANIC ACID SINGLE QUANT: CPT | Performed by: NURSE PRACTITIONER

## 2025-01-15 PROCEDURE — 96365 THER/PROPH/DIAG IV INF INIT: CPT | Mod: 59

## 2025-01-15 PROCEDURE — G0378 HOSPITAL OBSERVATION PER HR: HCPCS

## 2025-01-15 PROCEDURE — 81003 URINALYSIS AUTO W/O SCOPE: CPT | Performed by: EMERGENCY MEDICINE

## 2025-01-15 PROCEDURE — 80061 LIPID PANEL: CPT | Performed by: NURSE PRACTITIONER

## 2025-01-15 PROCEDURE — 84443 ASSAY THYROID STIM HORMONE: CPT | Performed by: NURSE PRACTITIONER

## 2025-01-15 PROCEDURE — 70450 CT HEAD/BRAIN W/O DYE: CPT

## 2025-01-15 PROCEDURE — 71045 X-RAY EXAM CHEST 1 VIEW: CPT | Mod: FOREIGN READ | Performed by: RADIOLOGY

## 2025-01-15 PROCEDURE — 36415 COLL VENOUS BLD VENIPUNCTURE: CPT | Performed by: NURSE PRACTITIONER

## 2025-01-15 PROCEDURE — 2500000001 HC RX 250 WO HCPCS SELF ADMINISTERED DRUGS (ALT 637 FOR MEDICARE OP): Performed by: NURSE PRACTITIONER

## 2025-01-15 PROCEDURE — 82607 VITAMIN B-12: CPT | Performed by: NURSE PRACTITIONER

## 2025-01-15 PROCEDURE — 2500000004 HC RX 250 GENERAL PHARMACY W/ HCPCS (ALT 636 FOR OP/ED)

## 2025-01-15 PROCEDURE — 99291 CRITICAL CARE FIRST HOUR: CPT | Mod: 25 | Performed by: NURSE PRACTITIONER

## 2025-01-15 PROCEDURE — 2500000001 HC RX 250 WO HCPCS SELF ADMINISTERED DRUGS (ALT 637 FOR MEDICARE OP)

## 2025-01-15 PROCEDURE — 70544 MR ANGIOGRAPHY HEAD W/O DYE: CPT

## 2025-01-15 PROCEDURE — 70544 MR ANGIOGRAPHY HEAD W/O DYE: CPT | Performed by: RADIOLOGY

## 2025-01-15 PROCEDURE — 2500000004 HC RX 250 GENERAL PHARMACY W/ HCPCS (ALT 636 FOR OP/ED): Mod: JZ | Performed by: EMERGENCY MEDICINE

## 2025-01-15 PROCEDURE — 70547 MR ANGIOGRAPHY NECK W/O DYE: CPT

## 2025-01-15 PROCEDURE — 85025 COMPLETE CBC W/AUTO DIFF WBC: CPT | Performed by: EMERGENCY MEDICINE

## 2025-01-15 RX ORDER — POTASSIUM CHLORIDE 750 MG/1
20 TABLET, FILM COATED, EXTENDED RELEASE ORAL ONCE
Status: COMPLETED | OUTPATIENT
Start: 2025-01-15 | End: 2025-01-15

## 2025-01-15 RX ORDER — CLOPIDOGREL BISULFATE 75 MG/1
75 TABLET ORAL DAILY
Status: DISCONTINUED | OUTPATIENT
Start: 2025-01-16 | End: 2025-01-19 | Stop reason: HOSPADM

## 2025-01-15 RX ORDER — ENOXAPARIN SODIUM 100 MG/ML
40 INJECTION SUBCUTANEOUS EVERY 24 HOURS
Status: DISCONTINUED | OUTPATIENT
Start: 2025-01-15 | End: 2025-01-15

## 2025-01-15 RX ORDER — ATORVASTATIN CALCIUM 40 MG/1
40 TABLET, FILM COATED ORAL NIGHTLY
Status: DISCONTINUED | OUTPATIENT
Start: 2025-01-15 | End: 2025-01-19 | Stop reason: HOSPADM

## 2025-01-15 RX ORDER — ONDANSETRON HYDROCHLORIDE 2 MG/ML
4 INJECTION, SOLUTION INTRAVENOUS EVERY 6 HOURS PRN
Status: DISCONTINUED | OUTPATIENT
Start: 2025-01-15 | End: 2025-01-19 | Stop reason: HOSPADM

## 2025-01-15 RX ORDER — ACETAMINOPHEN 325 MG/1
650 TABLET ORAL EVERY 4 HOURS PRN
Status: DISCONTINUED | OUTPATIENT
Start: 2025-01-15 | End: 2025-01-19 | Stop reason: HOSPADM

## 2025-01-15 RX ORDER — ASPIRIN 81 MG/1
81 TABLET ORAL DAILY
Status: DISCONTINUED | OUTPATIENT
Start: 2025-01-15 | End: 2025-01-19 | Stop reason: HOSPADM

## 2025-01-15 RX ORDER — VALSARTAN 320 MG/1
320 TABLET ORAL DAILY
Status: DISCONTINUED | OUTPATIENT
Start: 2025-01-15 | End: 2025-01-19 | Stop reason: HOSPADM

## 2025-01-15 RX ORDER — LORAZEPAM 2 MG/ML
1 INJECTION INTRAMUSCULAR ONCE
Status: COMPLETED | OUTPATIENT
Start: 2025-01-15 | End: 2025-01-15

## 2025-01-15 RX ORDER — ZIPRASIDONE MESYLATE 20 MG/ML
10 INJECTION, POWDER, LYOPHILIZED, FOR SOLUTION INTRAMUSCULAR ONCE
Status: COMPLETED | OUTPATIENT
Start: 2025-01-15 | End: 2025-01-15

## 2025-01-15 RX ORDER — METOPROLOL SUCCINATE 50 MG/1
50 TABLET, EXTENDED RELEASE ORAL DAILY
Status: DISCONTINUED | OUTPATIENT
Start: 2025-01-15 | End: 2025-01-16

## 2025-01-15 RX ORDER — PANTOPRAZOLE SODIUM 40 MG/10ML
40 INJECTION, POWDER, LYOPHILIZED, FOR SOLUTION INTRAVENOUS
Status: DISCONTINUED | OUTPATIENT
Start: 2025-01-16 | End: 2025-01-19 | Stop reason: HOSPADM

## 2025-01-15 RX ORDER — POTASSIUM CHLORIDE 1.5 G/1.58G
20 POWDER, FOR SOLUTION ORAL 2 TIMES DAILY
Status: COMPLETED | OUTPATIENT
Start: 2025-01-15 | End: 2025-01-15

## 2025-01-15 RX ORDER — CYANOCOBALAMIN 1000 UG/ML
1000 INJECTION, SOLUTION INTRAMUSCULAR; SUBCUTANEOUS DAILY
Status: DISCONTINUED | OUTPATIENT
Start: 2025-01-15 | End: 2025-01-17

## 2025-01-15 RX ORDER — TALC
3 POWDER (GRAM) TOPICAL NIGHTLY PRN
Status: DISCONTINUED | OUTPATIENT
Start: 2025-01-15 | End: 2025-01-19 | Stop reason: HOSPADM

## 2025-01-15 RX ORDER — CALCIUM GLUCONATE 20 MG/ML
2 INJECTION, SOLUTION INTRAVENOUS ONCE
Status: COMPLETED | OUTPATIENT
Start: 2025-01-15 | End: 2025-01-15

## 2025-01-15 RX ORDER — ESCITALOPRAM OXALATE 10 MG/1
20 TABLET ORAL DAILY
Status: DISCONTINUED | OUTPATIENT
Start: 2025-01-15 | End: 2025-01-19 | Stop reason: HOSPADM

## 2025-01-15 RX ORDER — PANTOPRAZOLE SODIUM 40 MG/1
40 TABLET, DELAYED RELEASE ORAL
Status: DISCONTINUED | OUTPATIENT
Start: 2025-01-16 | End: 2025-01-19 | Stop reason: HOSPADM

## 2025-01-15 RX ORDER — HYDRALAZINE HYDROCHLORIDE 20 MG/ML
10 INJECTION INTRAMUSCULAR; INTRAVENOUS ONCE
Status: DISCONTINUED | OUTPATIENT
Start: 2025-01-15 | End: 2025-01-16

## 2025-01-15 RX ORDER — LORAZEPAM 2 MG/ML
INJECTION INTRAMUSCULAR
Status: COMPLETED
Start: 2025-01-15 | End: 2025-01-15

## 2025-01-15 RX ORDER — CLOPIDOGREL BISULFATE 75 MG/1
300 TABLET ORAL ONCE
Status: COMPLETED | OUTPATIENT
Start: 2025-01-15 | End: 2025-01-15

## 2025-01-15 RX ADMIN — CALCIUM GLUCONATE 2 G: 20 INJECTION, SOLUTION INTRAVENOUS at 14:44

## 2025-01-15 RX ADMIN — CLOPIDOGREL 300 MG: 75 TABLET ORAL at 20:43

## 2025-01-15 RX ADMIN — POTASSIUM CHLORIDE 20 MEQ: 1.5 POWDER, FOR SOLUTION ORAL at 14:42

## 2025-01-15 RX ADMIN — ESCITALOPRAM OXALATE 20 MG: 10 TABLET ORAL at 14:42

## 2025-01-15 RX ADMIN — VALSARTAN 320 MG: 80 TABLET, FILM COATED ORAL at 14:18

## 2025-01-15 RX ADMIN — LORAZEPAM 1 MG: 2 INJECTION INTRAMUSCULAR; INTRAVENOUS at 13:28

## 2025-01-15 RX ADMIN — ATORVASTATIN CALCIUM 40 MG: 40 TABLET, FILM COATED ORAL at 20:43

## 2025-01-15 RX ADMIN — POTASSIUM CHLORIDE 20 MEQ: 1.5 POWDER, FOR SOLUTION ORAL at 20:43

## 2025-01-15 RX ADMIN — POTASSIUM CHLORIDE 20 MEQ: 750 TABLET, FILM COATED, EXTENDED RELEASE ORAL at 14:41

## 2025-01-15 RX ADMIN — ZIPRASIDONE MESYLATE 10 MG: 20 INJECTION, POWDER, LYOPHILIZED, FOR SOLUTION INTRAMUSCULAR at 14:46

## 2025-01-15 RX ADMIN — LORAZEPAM 1 MG: 2 INJECTION INTRAMUSCULAR at 13:28

## 2025-01-15 SDOH — SOCIAL STABILITY: SOCIAL INSECURITY: WERE YOU ABLE TO COMPLETE ALL THE BEHAVIORAL HEALTH SCREENINGS?: YES

## 2025-01-15 SDOH — ECONOMIC STABILITY: FOOD INSECURITY: WITHIN THE PAST 12 MONTHS, YOU WORRIED THAT YOUR FOOD WOULD RUN OUT BEFORE YOU GOT THE MONEY TO BUY MORE.: NEVER TRUE

## 2025-01-15 SDOH — SOCIAL STABILITY: SOCIAL INSECURITY: WITHIN THE LAST YEAR, HAVE YOU BEEN HUMILIATED OR EMOTIONALLY ABUSED IN OTHER WAYS BY YOUR PARTNER OR EX-PARTNER?: NO

## 2025-01-15 SDOH — ECONOMIC STABILITY: TRANSPORTATION INSECURITY: IN THE PAST 12 MONTHS, HAS LACK OF TRANSPORTATION KEPT YOU FROM MEDICAL APPOINTMENTS OR FROM GETTING MEDICATIONS?: NO

## 2025-01-15 SDOH — ECONOMIC STABILITY: FOOD INSECURITY: HOW HARD IS IT FOR YOU TO PAY FOR THE VERY BASICS LIKE FOOD, HOUSING, MEDICAL CARE, AND HEATING?: NOT VERY HARD

## 2025-01-15 SDOH — ECONOMIC STABILITY: HOUSING INSECURITY: IN THE LAST 12 MONTHS, WAS THERE A TIME WHEN YOU WERE NOT ABLE TO PAY THE MORTGAGE OR RENT ON TIME?: NO

## 2025-01-15 SDOH — ECONOMIC STABILITY: INCOME INSECURITY: IN THE PAST 12 MONTHS HAS THE ELECTRIC, GAS, OIL, OR WATER COMPANY THREATENED TO SHUT OFF SERVICES IN YOUR HOME?: NO

## 2025-01-15 SDOH — ECONOMIC STABILITY: HOUSING INSECURITY: AT ANY TIME IN THE PAST 12 MONTHS, WERE YOU HOMELESS OR LIVING IN A SHELTER (INCLUDING NOW)?: NO

## 2025-01-15 SDOH — ECONOMIC STABILITY: FOOD INSECURITY: WITHIN THE PAST 12 MONTHS, THE FOOD YOU BOUGHT JUST DIDN'T LAST AND YOU DIDN'T HAVE MONEY TO GET MORE.: NEVER TRUE

## 2025-01-15 SDOH — SOCIAL STABILITY: SOCIAL INSECURITY: WITHIN THE LAST YEAR, HAVE YOU BEEN AFRAID OF YOUR PARTNER OR EX-PARTNER?: NO

## 2025-01-15 SDOH — SOCIAL STABILITY: SOCIAL INSECURITY: HAVE YOU HAD THOUGHTS OF HARMING ANYONE ELSE?: NO

## 2025-01-15 ASSESSMENT — COGNITIVE AND FUNCTIONAL STATUS - GENERAL
DAILY ACTIVITIY SCORE: 24
MOBILITY SCORE: 24
PATIENT BASELINE BEDBOUND: NO
MOBILITY SCORE: 24
DAILY ACTIVITIY SCORE: 24

## 2025-01-15 ASSESSMENT — LIFESTYLE VARIABLES
HAVE PEOPLE ANNOYED YOU BY CRITICIZING YOUR DRINKING: NO
HOW OFTEN DURING THE LAST YEAR HAVE YOU FOUND THAT YOU WERE NOT ABLE TO STOP DRINKING ONCE YOU HAD STARTED: NEVER
AUDIT-C TOTAL SCORE: 7
EVER HAD A DRINK FIRST THING IN THE MORNING TO STEADY YOUR NERVES TO GET RID OF A HANGOVER: NO
HOW OFTEN DURING THE LAST YEAR HAVE YOU BEEN UNABLE TO REMEMBER WHAT HAPPENED THE NIGHT BEFORE BECAUSE YOU HAD BEEN DRINKING: NEVER
HOW OFTEN DURING THE LAST YEAR HAVE YOU HAD A FEELING OF GUILT OR REMORSE AFTER DRINKING: NEVER
HOW OFTEN DO YOU HAVE 6 OR MORE DRINKS ON ONE OCCASION: WEEKLY
SUBSTANCE_ABUSE_PAST_12_MONTHS: YES
HOW OFTEN DURING THE LAST YEAR HAVE YOU NEEDED AN ALCOHOLIC DRINK FIRST THING IN THE MORNING TO GET YOURSELF GOING AFTER A NIGHT OF HEAVY DRINKING: NEVER
HOW MANY STANDARD DRINKS CONTAINING ALCOHOL DO YOU HAVE ON A TYPICAL DAY: 5 OR 6
SKIP TO QUESTIONS 9-10: 0
AUDIT TOTAL SCORE: 0
PRESCIPTION_ABUSE_PAST_12_MONTHS: NO
HAVE YOU EVER FELT YOU SHOULD CUT DOWN ON YOUR DRINKING: NO
HOW OFTEN DURING THE LAST YEAR HAVE YOU FAILED TO DO WHAT WAS NORMALLY EXPECTED FROM YOU BECAUSE OF DRINKING: NEVER
HAS A RELATIVE, FRIEND, DOCTOR, OR ANOTHER HEALTH PROFESSIONAL EXPRESSED CONCERN ABOUT YOUR DRINKING OR SUGGESTED YOU CUT DOWN: NO
HAVE YOU OR SOMEONE ELSE BEEN INJURED AS A RESULT OF YOUR DRINKING: NO
AUDIT TOTAL SCORE: 7
AUDIT-C TOTAL SCORE: 7
HOW OFTEN DO YOU HAVE A DRINK CONTAINING ALCOHOL: 2-4 TIMES A MONTH
TOTAL SCORE: 0
EVER FELT BAD OR GUILTY ABOUT YOUR DRINKING: NO

## 2025-01-15 ASSESSMENT — PAIN SCALES - GENERAL
PAINLEVEL_OUTOF10: 0 - NO PAIN

## 2025-01-15 ASSESSMENT — ACTIVITIES OF DAILY LIVING (ADL)
WALKS IN HOME: INDEPENDENT
BATHING: INDEPENDENT
HEARING - RIGHT EAR: FUNCTIONAL
HEARING - LEFT EAR: FUNCTIONAL
PATIENT'S MEMORY ADEQUATE TO SAFELY COMPLETE DAILY ACTIVITIES?: YES
GROOMING: INDEPENDENT
TOILETING: INDEPENDENT
DRESSING YOURSELF: INDEPENDENT
FEEDING YOURSELF: INDEPENDENT
LACK_OF_TRANSPORTATION: NO
LACK_OF_TRANSPORTATION: NO
ADEQUATE_TO_COMPLETE_ADL: YES
JUDGMENT_ADEQUATE_SAFELY_COMPLETE_DAILY_ACTIVITIES: YES

## 2025-01-15 ASSESSMENT — PATIENT HEALTH QUESTIONNAIRE - PHQ9
1. LITTLE INTEREST OR PLEASURE IN DOING THINGS: NOT AT ALL
2. FEELING DOWN, DEPRESSED OR HOPELESS: NOT AT ALL
SUM OF ALL RESPONSES TO PHQ9 QUESTIONS 1 & 2: 0

## 2025-01-15 ASSESSMENT — ENCOUNTER SYMPTOMS
FLANK PAIN: 0
WEAKNESS: 0
ABDOMINAL PAIN: 0
WHEEZING: 0
HEADACHES: 0
PALPITATIONS: 0
CONFUSION: 1
BACK PAIN: 0
CONSTIPATION: 0
FATIGUE: 0
COUGH: 0
HEMATURIA: 0
WOUND: 0
VOMITING: 1
SHORTNESS OF BREATH: 0
TREMORS: 0
CHILLS: 0
JOINT SWELLING: 0
DIZZINESS: 1
FEVER: 0
CHEST TIGHTNESS: 0
AGITATION: 1
NAUSEA: 1
DIARRHEA: 1

## 2025-01-15 ASSESSMENT — COLUMBIA-SUICIDE SEVERITY RATING SCALE - C-SSRS
6. HAVE YOU EVER DONE ANYTHING, STARTED TO DO ANYTHING, OR PREPARED TO DO ANYTHING TO END YOUR LIFE?: NO
2. HAVE YOU ACTUALLY HAD ANY THOUGHTS OF KILLING YOURSELF?: NO
1. IN THE PAST MONTH, HAVE YOU WISHED YOU WERE DEAD OR WISHED YOU COULD GO TO SLEEP AND NOT WAKE UP?: NO

## 2025-01-15 ASSESSMENT — PAIN - FUNCTIONAL ASSESSMENT: PAIN_FUNCTIONAL_ASSESSMENT: 0-10

## 2025-01-15 NOTE — ED PROVIDER NOTES
Chief Complaint   Patient presents with    Altered Mental Status       HPI       64 year old male presents to the Emergency Department today complaining of a continued altered mental status. Wife reports that he continues to be confused after recently being discharged from the hospital. States that he keeps stating repetitive statements and asking to see is mother who was standing right in front of him. She reports that he has been more agitated as well. Denies any associated fever, chills, headache, neck pain, chest pain, shortness of breath, abdominal pain, nausea, vomiting, diarrhea, constipation, or urinary symptoms. Record review shows that he had a negative CVA workup that included a MRI. Tested positive for influenza. Started back on his hypertension medication following the recent admission.       History provided by:  Patient             Patient History   Past Medical History:   Diagnosis Date    Cancer (Multi)     about 8 years ago, salivary gland CA    Hypertension      History reviewed. No pertinent surgical history.  No family history on file.  Social History     Tobacco Use    Smoking status: Every Day     Current packs/day: 0.50     Types: Cigarettes    Smokeless tobacco: Never   Substance Use Topics    Alcohol use: Yes     Comment: weekends    Drug use: Never           Physical Exam  Constitutional:       Appearance: Normal appearance.   HENT:      Head: Normocephalic.      Right Ear: Tympanic membrane, ear canal and external ear normal.      Left Ear: Tympanic membrane, ear canal and external ear normal.      Nose: Nose normal.      Mouth/Throat:      Mouth: Mucous membranes are moist.      Pharynx: Oropharynx is clear. No oropharyngeal exudate or posterior oropharyngeal erythema.   Eyes:      Conjunctiva/sclera: Conjunctivae normal.      Pupils: Pupils are equal, round, and reactive to light.   Cardiovascular:      Rate and Rhythm: Normal rate and regular rhythm.      Pulses:           Radial  pulses are 3+ on the right side and 3+ on the left side.        Dorsalis pedis pulses are 3+ on the right side and 3+ on the left side.      Heart sounds: Normal heart sounds. No murmur heard.     No friction rub. No gallop.   Pulmonary:      Effort: Pulmonary effort is normal. No respiratory distress.      Breath sounds: Normal breath sounds. No wheezing, rhonchi or rales.   Abdominal:      General: Abdomen is flat. Bowel sounds are normal.      Palpations: Abdomen is soft.      Tenderness: There is no abdominal tenderness. There is no right CVA tenderness, left CVA tenderness, guarding or rebound. Negative signs include Moya's sign and McBurney's sign.   Musculoskeletal:         General: No swelling or deformity.      Cervical back: Full passive range of motion without pain.      Right lower leg: No edema.      Left lower leg: No edema.   Lymphadenopathy:      Cervical: No cervical adenopathy.   Skin:     Capillary Refill: Capillary refill takes less than 2 seconds.      Coloration: Skin is not jaundiced.      Findings: No rash.   Neurological:      General: No focal deficit present.      Mental Status: He is alert. He is confused.      GCS: GCS eye subscore is 4. GCS verbal subscore is 5. GCS motor subscore is 6.      Cranial Nerves: Cranial nerves 2-12 are intact.      Sensory: Sensation is intact.      Motor: Motor function is intact.      Coordination: Coordination is intact.      Gait: Gait is intact.   Psychiatric:         Mood and Affect: Mood normal.         Behavior: Behavior is cooperative.         Labs Reviewed   CBC WITH AUTO DIFFERENTIAL - Abnormal       Result Value    WBC 4.5      nRBC 0.0      RBC 4.62      Hemoglobin 14.0      Hematocrit 42.2      MCV 91      MCH 30.3      MCHC 33.2      RDW 14.3      Platelets 166      Neutrophils % 60.8      Immature Granulocytes %, Automated 0.4      Lymphocytes % 23.6      Monocytes % 14.6      Eosinophils % 0.4      Basophils % 0.2      Neutrophils Absolute  2.70      Immature Granulocytes Absolute, Automated 0.02      Lymphocytes Absolute 1.05 (*)     Monocytes Absolute 0.65      Eosinophils Absolute 0.02      Basophils Absolute 0.01     COMPREHENSIVE METABOLIC PANEL - Abnormal    Glucose 103 (*)     Sodium 144      Potassium 2.9 (*)     Chloride 119 (*)     Bicarbonate 20 (*)     Anion Gap 8 (*)     Urea Nitrogen 8      Creatinine 0.48 (*)     eGFR >90      Calcium 5.5 (*)     Albumin 2.4 (*)     Alkaline Phosphatase 30 (*)     Total Protein 3.8 (*)     AST 12      Bilirubin, Total 0.3      ALT 11     DRUG SCREEN,URINE - Abnormal    Amphetamine Screen, Urine Presumptive Negative      Barbiturate Screen, Urine Presumptive Negative      Benzodiazepines Screen, Urine Presumptive Negative      Cannabinoid Screen, Urine Presumptive Positive (*)     Cocaine Metabolite Screen, Urine Presumptive Negative      Fentanyl Screen, Urine Presumptive Negative      Opiate Screen, Urine Presumptive Negative      Oxycodone Screen, Urine Presumptive Negative      PCP Screen, Urine Presumptive Negative      Methadone Screen, Urine Presumptive Negative      Narrative:     Drug screen results are presumptive and should not be used to assess   compliance with prescribed medication. Contact the performing [facility] laboratory   to add-on definitive confirmatory testing if clinically indicated.    Toxicology screening results are reported qualitatively. The concentration must   be greater than or equal to the cutoff to be reported as positive. The concentration   at which the screening test can detect an individual drug or metabolite varies.   The absence of expected drug(s) and/or drug metabolite(s) may indicate non-compliance,   inappropriate timing of specimen collection relative to drug administration, poor drug   absorption, diluted/adulterated urine, or limitations of testing. For medical purposes   only; not valid for forensic use.    Interpretive questions should be directed to the  laboratory medical directors.   POCT GLUCOSE - Abnormal    POCT Glucose 180 (*)    ACUTE TOXICOLOGY PANEL, BLOOD - Normal    Acetaminophen <10.0      Salicylate  <3      Alcohol <10     URINALYSIS WITH REFLEX CULTURE AND MICROSCOPIC    Narrative:     The following orders were created for panel order Urinalysis with Reflex Culture and Microscopic.  Procedure                               Abnormality         Status                     ---------                               -----------         ------                     Urinalysis with Reflex C...[[id number]]                                                 Extra Urine Gray Tube[[id number]]                                                         Please view results for these tests on the individual orders.   URINALYSIS WITH REFLEX CULTURE AND MICROSCOPIC   EXTRA URINE GRAY TUBE       XR chest 1 view   Final Result   No acute radiographic chest finding.   Signed by Colin Lucero MD      CT head wo IV contrast   Final Result   No acute intracranial pathologic findings are identified.   There is no interval change when compared to the previous examination.        MACRO:   none        Signed by: Kenneth Leyva 1/15/2025 1:46 PM   Dictation workstation:   [id number]               ED Course & MDM   Diagnoses as of 01/15/25 1521   Altered mental status, unspecified altered mental status type   Hypokalemia   Hypocalcemia   Hypertensive urgency           Medical Decision Making  EKG interpreted by Dr. Valdivia. Indication: mental status change. Findings: SB with a ventricular rate of 59, normal axis, normal intervals, and no acute ischemic or injury pattern. Impression: No acute pathology.       Patient was seen and evaluated by Dr. Valdivia. Record review shows that he had a negative CVA workup that included a MRI. Tested positive for influenza. Started back on his hypertension medication following the recent admission. Placed on a cardiac monitor which showed normal sinus  rhythm without ectopy throughout ED stay. Rhythm strip obtained showed normal sinus rhythm. Impression: No acute pathology. Continuous pulse oximetry monitoring showed no signs of hypoxia. Saline lock was established with labs drawn and results as above. Was given Ativan and Geodon with improvement in his agitation. Treated his hypertension with Hydralazine IV, Metoprolol po, and Valsartan po with improvement in his blood pressure. Noted to have hypokalemia with a potassium of 2.9 and was replenished. Noted to have hypocalcemia with a calcium of 5.5 as that was replenished as well. Blood counts, remainder of electrolytes, and kidney function were unremarkable. Drug screen was positive for cannabis. CXR shows no acute radiographic chest finding. CT scan of his head without contrast shows no acute intracranial pathologic findings are identified with no interval change when compared to the previous examination. Case was discussed with LAUREN Richard PA-C, who agrees to place the patient under 23 hour observation for further evaluation and care. Will be transferred to the medical floor in stable condition.     Diagnostic Impression:    1. Acute change in mental status     2. Hypertensive Urgency    3. Acute hypokalemia and hypocalcemia    4. IV meds in ED    5. Critical care time 40 minutes               Your medication list        ASK your doctor about these medications        Instructions Last Dose Given Next Dose Due   escitalopram 20 mg tablet  Commonly known as: Lexapro           metoprolol succinate XL 50 mg 24 hr tablet  Commonly known as: Toprol-XL           valsartan 320 mg tablet  Commonly known as: Diovan                      Procedure  Critical Care    Performed by: JCARLOS Monaco-CNP  Authorized by: Ike Valdivia MD    Critical care provider statement:     Critical care time (minutes):  40    Critical care time was exclusive of:  Separately billable procedures and treating other patients     Critical care was necessary to treat or prevent imminent or life-threatening deterioration of the following conditions: Change in Mental status with hypertensive urgency.       Eder Morrison, APRN-CNP  01/15/25 1524

## 2025-01-15 NOTE — ED TRIAGE NOTES
Patient arrived via ems for complaint of alered mental status and high blood pressure. Was discharged yesterday from here for same complaint of high BP and altered mental. Patient is able to stat his name, year, place. Year is 2024 to him,,  very cconfused with responses per wife. Wants to go upstairs to see mother who was discharged yesterday also. NIH neggative. According to wife and patient, has not been taking meds as preescribed but did take BP meds this morning per wife

## 2025-01-15 NOTE — CONSULTS
"Inpatient consult to Neurology  Consult performed by: Shaggy Corcoran MD  Consult ordered by: Jennifer Garces PA-C          History Of Present Illness  Cheng De Jesus \"Luther\" is a 64 y.o. male presenting with confusion.  The patient was recently admitted to [facility] with numbness and tingling of his right hand and lips.  He woke up drenched in sweat and also felt dizzy with near syncope.  He does not remember how he got to the hospital.  His daughter states that he became really confused and kept repeating that he has to see his mother in the hospital.  The patient also has not been taking any of his blood pressure medicines.  The patient had a CT scan of the brain and MRI of the brain and these were both needed for any significant abnormality.  The patient was sent home.  The patient was apparently doing well upon discharge.  His wife states that his anxiety has been high since his mom's been hospitalized.  He became very confused and was repeating that he has to see his mom in the hospital but his mother was discharged yesterday.  The patient subsequently was admitted through the ER and became verbally violent on his arrival there.  His wife notes that he took his blood pressure medicines in the morning.  She also notes that he has been complaining of dizziness as well.  The the patient was diagnosed with influenza A 2 days ago.  The patient also had 2 episodes of nausea and vomiting as well as diarrhea.  The patient did receive Geodon and benzodiazepines.  The patient did have a CT scan brain done that was negative for any acute process.  Currently the patient is complaining of her left frontal headache that is a 5 out of 10 in severity.    Past Medical History  Past Medical History:   Diagnosis Date    Cancer (Multi)     about 8 years ago, salivary gland CA    Hypertension      Surgical History  History reviewed. No pertinent surgical history.  Social History  Social History     Tobacco Use    Smoking status: Every " "Day     Current packs/day: 0.50     Types: Cigarettes    Smokeless tobacco: Never   Substance Use Topics    Alcohol use: Yes     Comment: weekends    Drug use: Never   The patient lives at home and smokes about half a pack of cigarettes a day.  He does not use alcohol.  Allergies  Lisinopril, Morphine, and Valium [diazepam]  Medications Prior to Admission   Medication Sig Dispense Refill Last Dose/Taking    escitalopram (Lexapro) 20 mg tablet Take 1 tablet (20 mg) by mouth once daily.       metoprolol succinate XL (Toprol-XL) 50 mg 24 hr tablet Take 1 tablet (50 mg) by mouth once daily.       valsartan (Diovan) 320 mg tablet Take 1 tablet (320 mg) by mouth once daily.        Family history  The patient's family history was reviewed and is noncontributory.    Review of Systems   Psychiatric/Behavioral:  Positive for agitation and confusion.    All other systems reviewed and are negative.    Neurological Exam  Physical Exam  Last Recorded Vitals  Blood pressure (!) 161/97, pulse 55, temperature 36.8 °C (98.3 °F), temperature source Temporal, resp. rate 16, height 1.88 m (6' 2\"), weight 86.2 kg (190 lb), SpO2 97%.    The patient is a well developed, [well-nourished] [male] in no acute distress.    The patient's funduscopic examination shows no papilledema bilaterally.    The patient's extremity examination shows that the pulses are 2+ in the upper and lower extremities bilaterally and there is no edema in the lower extremities bilaterally.    The patient's mental status testing is alert and oriented ×3 with no evidence of aphasia or dysarthria.  The patient's memory testing, fund of knowledge and concentration are all within normal limits.  The patient's cranial nerves 2, 3, 4, 5, 6, 7, 8, 9, 10, 11 and 12 are all within normal limits.  The patient's motor testing shows normal tone, bulk, and power in the upper and lower extremities bilaterally.  The patient's sensory testing is intact to light touch in the upper and " lower extremities bilaterally.  The patient's cerebellar testing is intact in the upper and lower extremities bilaterally.  The patient's station and gait are normal.  The patient's reflexes are 1+ in the upper and lower extremities and symmetrical.    Relevant Results        [place] Coma Scale  Best Eye Response: Spontaneous  Best Verbal Response: Confused  Best Motor Response: Follows commands  Ariella Coma Scale Score: 14        Scheduled medications  enoxaparin, 40 mg, subcutaneous, q24h  escitalopram, 20 mg, oral, Daily  hydrALAZINE, 10 mg, intravenous, Once  metoprolol succinate XL, 50 mg, oral, Daily  [START ON 1/16/2025] pantoprazole, 40 mg, oral, Daily before breakfast   Or  [START ON 1/16/2025] pantoprazole, 40 mg, intravenous, Daily before breakfast  potassium chloride, 20 mEq, oral, BID  valsartan, 320 mg, oral, Daily      Continuous medications     PRN medications  PRN medications: acetaminophen, melatonin, ondansetron  Results for orders placed or performed during the hospital encounter of 01/15/25 (from the past 96 hours)   POCT GLUCOSE   Result Value Ref Range    POCT Glucose 180 (H) 74 - 99 mg/dL   ECG 12 lead   Result Value Ref Range    Ventricular Rate 59 BPM    Atrial Rate 58 BPM    NH Interval 195 ms    QRS Duration 144 ms    QT Interval 423 ms    QTC Calculation(Bazett) 419 ms    P Axis 58 degrees    R Axis 85 degrees    T Axis 62 degrees    QRS Count 9 beats    Q Onset 249 ms    T Offset 461 ms    QTC Fredericia 420 ms   CBC and Auto Differential   Result Value Ref Range    WBC 4.5 4.4 - 11.3 x10*3/uL    nRBC 0.0 0.0 - 0.0 /100 WBCs    RBC 4.62 4.50 - 5.90 x10*6/uL    Hemoglobin 14.0 13.5 - 17.5 g/dL    Hematocrit 42.2 41.0 - 52.0 %    MCV 91 80 - 100 fL    MCH 30.3 26.0 - 34.0 pg    MCHC 33.2 32.0 - 36.0 g/dL    RDW 14.3 11.5 - 14.5 %    Platelets 166 150 - 450 x10*3/uL    Neutrophils % 60.8 40.0 - 80.0 %    Immature Granulocytes %, Automated 0.4 0.0 - 0.9 %    Lymphocytes % 23.6 13.0 - 44.0  %    Monocytes % 14.6 2.0 - 10.0 %    Eosinophils % 0.4 0.0 - 6.0 %    Basophils % 0.2 0.0 - 2.0 %    Neutrophils Absolute 2.70 1.20 - 7.70 x10*3/uL    Immature Granulocytes Absolute, Automated 0.02 0.00 - 0.70 x10*3/uL    Lymphocytes Absolute 1.05 (L) 1.20 - 4.80 x10*3/uL    Monocytes Absolute 0.65 0.10 - 1.00 x10*3/uL    Eosinophils Absolute 0.02 0.00 - 0.70 x10*3/uL    Basophils Absolute 0.01 0.00 - 0.10 x10*3/uL   Comprehensive metabolic panel   Result Value Ref Range    Glucose 103 (H) 74 - 99 mg/dL    Sodium 144 136 - 145 mmol/L    Potassium 2.9 (LL) 3.5 - 5.3 mmol/L    Chloride 119 (H) 98 - 107 mmol/L    Bicarbonate 20 (L) 21 - 32 mmol/L    Anion Gap 8 (L) 10 - 20 mmol/L    Urea Nitrogen 8 6 - 23 mg/dL    Creatinine 0.48 (L) 0.50 - 1.30 mg/dL    eGFR >90 >60 mL/min/1.73m*2    Calcium 5.5 (LL) 8.6 - 10.3 mg/dL    Albumin 2.4 (L) 3.4 - 5.0 g/dL    Alkaline Phosphatase 30 (L) 33 - 136 U/L    Total Protein 3.8 (L) 6.4 - 8.2 g/dL    AST 12 9 - 39 U/L    Bilirubin, Total 0.3 0.0 - 1.2 mg/dL    ALT 11 10 - 52 U/L   Drug Screen, Urine   Result Value Ref Range    Amphetamine Screen, Urine Presumptive Negative Presumptive Negative    Barbiturate Screen, Urine Presumptive Negative Presumptive Negative    Benzodiazepines Screen, Urine Presumptive Negative Presumptive Negative    Cannabinoid Screen, Urine Presumptive Positive (A) Presumptive Negative    Cocaine Metabolite Screen, Urine Presumptive Negative Presumptive Negative    Fentanyl Screen, Urine Presumptive Negative Presumptive Negative    Opiate Screen, Urine Presumptive Negative Presumptive Negative    Oxycodone Screen, Urine Presumptive Negative Presumptive Negative    PCP Screen, Urine Presumptive Negative Presumptive Negative    Methadone Screen, Urine Presumptive Negative Presumptive Negative   Acute Toxicology Panel, Blood   Result Value Ref Range    Acetaminophen <10.0 10.0 - 30.0 ug/mL    Salicylate  <3 4 - 20 mg/dL    Alcohol <10 <=10 mg/dL   Vitamin B12    Result Value Ref Range    Vitamin B12 155 (L) 211 - 911 pg/mL   Folate   Result Value Ref Range    Folate, Serum 12.9 >5.0 ng/mL   TSH   Result Value Ref Range    Thyroid Stimulating Hormone 0.71 0.44 - 3.98 mIU/L   Urinalysis with Reflex Culture and Microscopic   Result Value Ref Range    Color, Urine Colorless (N) Light-Yellow, Yellow, Dark-Yellow    Appearance, Urine Clear Clear    Specific Gravity, Urine 1.007 1.005 - 1.035    pH, Urine 7.5 5.0, 5.5, 6.0, 6.5, 7.0, 7.5, 8.0    Protein, Urine NEGATIVE NEGATIVE, 10 (TRACE), 20 (TRACE) mg/dL    Glucose, Urine Normal Normal mg/dL    Blood, Urine NEGATIVE NEGATIVE    Ketones, Urine NEGATIVE NEGATIVE mg/dL    Bilirubin, Urine NEGATIVE NEGATIVE    Urobilinogen, Urine Normal Normal mg/dL    Nitrite, Urine NEGATIVE NEGATIVE    Leukocyte Esterase, Urine NEGATIVE NEGATIVE              I have personally reviewed the following imaging results XR chest 1 view    Result Date: 1/15/2025  STUDY: Chest Radiograph;  1/15/2025 1:58PM INDICATION: Shortness of breath. COMPARISON: XR Chest: 2/2/2022 ACCESSION NUMBER(S): [id number] ORDERING CLINICIAN: WALTER SALEH TECHNIQUE:  Frontal chest was obtained at 13:57 hours. FINDINGS: Cardiac silhouette normal in size. No significant pulmonary vascular congestion. No significant pleural effusion. No visible pneumothorax.     No acute radiographic chest finding. Signed by Colin Lucero MD    CT head wo IV contrast    Result Date: 1/15/2025  Interpreted By:  Kenneth Leyva, STUDY: CT HEAD WO IV CONTRAST; 1/15/2025 1:34 pm   INDICATION: Signs/Symptoms:Confusion.   COMPARISON: 01/13/2025   ACCESSION NUMBER(S): [id number]   ORDERING CLINICIAN: WALTER SALEH   TECHNIQUE: A helical acquisition data was obtained.   One or more of the following dose reduction techniques were used: Automated exposure control Adjustment of the mA and/or kV according to patient size, and/or use of iterative reconstruction technique.   FINDINGS: Intracranial  findings: There is no evidence for intracranial hemorrhage or mass effect.   Paranasal sinuses and temporal bone findings:  The visualized portions of the paranasal sinuses, mastoid air cells, and middle ear cavities are clear.   Orbital findings: The visualized portions of the orbits are unremarkable.       No acute intracranial pathologic findings are identified. There is no interval change when compared to the previous examination.   MACRO: none   Signed by: Kenneth Leyva 1/15/2025 1:46 PM Dictation workstation:   [id number]    ECG 12 lead    Result Date: 1/15/2025  Sinus rhythm Right bundle branch block    Transthoracic Echo (TTE) Complete    Result Date: 1/13/2025              [place] [address]      Phone [phone] Fax [phone] TRANSTHORACIC ECHOCARDIOGRAM REPORT Patient Name:       CEE BRIJESH Merritt Physician:    [id number] Lazaro Wan MD Study Date:         1/13/2025            Ordering Provider:    [id number] STEELE MRN/PID:            [id number]             Fellow: Accession#:         [id number]         Nurse:                Emergency RN Date of Birth/Age:  1960 / 64 years  Sonographer:          Lanny Love RDCS Gender Assigned at  M                    Additional Staff: Birth: Height:             187.96 cm            Admit Date:           1/13/2025 Weight:             86.18 kg             Admission Status:     Inpatient -                                                                Routine BSA / BMI:          2.13 m2 / 24.39      Department Location:  [facility]                     kg/m2 Blood Pressure: 164 /101 mmHg Study Type:    TRANSTHORACIC ECHO (TTE) COMPLETE Diagnosis/ICD: Other transient cerebral ischemic attacks and related                syndromes-G45.8 Indication:    Altered Mental Status CPT Codes:     Echo Complete w Full [id number] Patient History:  Pertinent History: HTN. Study Detail: The following Echo studies were performed: 2D, M-Mode, Doppler and               color flow.  PHYSICIAN INTERPRETATION: Left Ventricle: Left ventricular ejection fraction is normal, calculated by Box's biplane at 63%. There are no regional wall motion abnormalities. The left ventricular cavity size is normal. There is normal septal and normal posterior left ventricular wall thickness. Spectral Doppler shows a normal pattern of left ventricular diastolic filling. Left Atrium: The left atrium is normal in size. A bubble study using agitated saline was performed. Bubble study is negative. Right Ventricle: The right ventricle is normal in size. There is normal right ventricular global systolic function. Right Atrium: The right atrium is normal in size. Aortic Valve: The aortic valve is trileaflet. The aortic valve dimensionless index is 0.97. There is no evidence of aortic valve regurgitation. The peak instantaneous gradient of the aortic valve is 9 mmHg. The mean gradient of the aortic valve is 4 mmHg. Mitral Valve: The mitral valve is normal in structure. There is no evidence of mitral valve regurgitation. Tricuspid Valve: The tricuspid valve is structurally normal. There is trace tricuspid regurgitation. Pulmonic Valve: The pulmonic valve is structurally normal. There is no indication of pulmonic valve regurgitation. Pericardium: No pericardial effusion noted. There is a pericardial fat pad present. Aorta: The aortic root is abnormal. There is mild dilatation of the aortic root.  CONCLUSIONS:  1. Left ventricular ejection fraction is normal, calculated by Box's biplane at 63%.  2. There is normal right ventricular global systolic function. QUANTITATIVE DATA SUMMARY:  2D MEASUREMENTS:           Normal Ranges: LAs:             3.03 cm   (2.7-4.0cm) IVSd:            0.89 cm   (0.6-1.1cm) LVPWd:           0.93 cm   (0.6-1.1cm) LVIDd:           4.57 cm   (3.9-5.9cm) LVIDs:            3.07 cm LV Mass Index:   65.1 g/m2 LV % FS          32.9 %  LA VOLUME:                    Normal Ranges: LA Vol A4C:        53.9 ml    (22+/-6mL/m2) LA Vol A2C:        58.8 ml LA Vol BP:         56.8 ml LA Vol Index A4C:  25.3 ml/m2 LA Vol Index A2C:  27.6 ml/m2 LA Vol Index BP:   26.7 ml/m2 LA Area A4C:       18.5 cm2 LA Area A2C:       19.5 cm2 LA Major Axis A4C: 5.4 cm LA Major Axis A2C: 5.5 cm LA Volume Index:   26.7 ml/m2 LA Vol A4C:        52.8 ml LA Vol A2C:        57.3 ml LA Vol Index BSA:  25.9 ml/m2  RA VOLUME BY A/L METHOD:          Normal Ranges: RA Area A4C:             15.2 cm2  AORTA MEASUREMENTS:         Normal Ranges: Ao Sinus, d:        4.00 cm (2.1-3.5cm) Ao STJ, d:          3.10 cm (1.7-3.4cm) Asc Ao, d:          3.20 cm (2.1-3.4cm)  LV SYSTOLIC FUNCTION BY 2D PLANIMETRY (MOD):                      Normal Ranges: EF-A4C View:    63 % (>=55%) EF-A2C View:    64 % EF-Biplane:     63 % LV EF Reported: 63 %  LV DIASTOLIC FUNCTION:           Normal Ranges: MV Peak E:             0.61 m/s  (0.7-1.2 m/s) MV Peak A:             0.63 m/s  (0.42-0.7 m/s) E/A Ratio:             0.97      (1.0-2.2) MV e'                  0.102 m/s (>8.0) MV lateral e'          0.12 m/s MV medial e'           0.08 m/s E/e' Ratio:            6.01      (<8.0)  MITRAL VALVE:          Normal Ranges: MV DT:        246 msec (150-240msec)  AORTIC VALVE:                     Normal Ranges: AoV Vmax:                1.49 m/s (<=1.7m/s) AoV Peak P.9 mmHg (<20mmHg) AoV Mean P.2 mmHg (1.7-11.5mmHg) LVOT Max Michele:            1.51 m/s (<=1.1m/s) AoV VTI:                 28.06 cm (18-25cm) LVOT VTI:                27.30 cm LVOT Diameter:           2.15 cm  (1.8-2.4cm) AoV Area, VTI:           3.53 cm2 (2.5-5.5cm2) AoV Area,Vmax:           3.69 cm2 (2.5-4.5cm2) AoV Dimensionless Index: 0.97  RIGHT VENTRICLE: RV Basal 4.02 cm RV Mid   3.50 cm RV Major 7.4 cm TAPSE:   25.0 mm RV s'    0.16 m/s  TRICUSPID  VALVE/RVSP:          Normal Ranges: Peak TR Velocity:     2.06 m/s RV Syst Pressure:     20 mmHg  (< 30mmHg) IVC Diam:             1.24 cm  AORTA: Asc Ao Diam 3.21 cm  [id number] Lazaro Wan MD Electronically signed on 1/13/2025 at 5:01:20 PM  ** Final **     MR brain wo IV contrast    Result Date: 1/13/2025  Interpreted By:  Boo Greene, STUDY: MR BRAIN WO IV CONTRAST   INDICATION: Signs/Symptoms:dizziness   COMPARISON: Head CT 01/13/2025.   ACCESSION NUMBER(S): [id number]   ORDERING CLINICIAN: FAREED STEELE   TECHNIQUE: Multi-planar multi-sequential MR imaging of the brain was performed without intravenous contrast.   FINDINGS:   No acute infarction, intracranial hemorrhage or mass. Mild scattered FLAIR hyperintense foci predominantly within the subcortical and deep white matter of bilateral frontal lobes.   No hydrocephalus.  No extra-axial fluid collections.   The skull base flow voids are present.   The visualized intraorbital contents are normal.   The imaged portions of the paranasal sinuses are clear. The mastoid air cells are clear.   Partially visualized T2 hyperintense curvilinear structure along the left masseter muscle (series 401, image 1).       No acute intracranial abnormality.   Mild scattered FLAIR hyperintense foci predominantly within the subcortical and deep white matter of bilateral frontal lobes, which may represent microangiopathic disease versus sequela of migraines. Demyelinating process is thought to be less likely.   Partially visualized T2 hyperintense curvilinear structure along the left masseter muscle, which may represent parotid sialocele.   Signed by: Boo Greene 1/13/2025 3:55 PM Dictation workstation:   [id number]    XR chest 1 view    Result Date: 1/13/2025  Interpreted By:  Schoenberger, Joseph, STUDY: XR CHEST 1 VIEW;  1/13/2025 9:54 am   INDICATION: Signs/Symptoms:confusion.     COMPARISON: 02/02/2022   ACCESSION NUMBER(S): [id number]   ORDERING CLINICIAN: OTILIO  SHAUN   FINDINGS:         CARDIOMEDIASTINAL SILHOUETTE: Cardiac silhouette is normal in size. No venous congestion. There is tortuosity and possibly some ectasia of the thoracic aorta though this is unchanged from prior.   LUNGS: No new focal lung opacity.   ABDOMEN: No remarkable upper abdominal findings.   BONES: No acute osseous changes.       1.  Stable chest       MACRO: None   Signed by: Joseph Schoenberger 1/13/2025 9:56 AM Dictation workstation:   [id number]    ECG 12 lead    Result Date: 1/13/2025  Sinus rhythm RBBB and LPFB    CT head wo IV contrast    Result Date: 1/13/2025  Interpreted By:  Schoenberger, Joseph, STUDY: CT HEAD WO IV CONTRAST;  1/13/2025 9:43 am   INDICATION: Signs/Symptoms:dizzy confusion.     COMPARISON: None.   ACCESSION NUMBER(S): [id number]   ORDERING CLINICIAN: OTILIO DUNN   TECHNIQUE: Noncontrast axial CT scan of head was performed. Angled reformats in brain and bone windows were generated. The images were reviewed in bone, brain, blood and soft tissue windows.   FINDINGS: CSF Spaces: The ventricles, sulci and basal cisterns are within normal limits. There is no extraaxial fluid collection.   Parenchyma:  The grey-white differentiation is intact. There is no mass effect or midline shift.  There is no intracranial hemorrhage.   Calvarium: The calvarium is unremarkable.   Paranasal sinuses and mastoids: Visualized paranasal sinuses and mastoids are clear.       No evidence of acute cortical infarct or intracranial hemorrhage.   No evidence of intracranial hemorrhage or displaced skull fracture.   MACRO: None   Signed by: Joseph Schoenberger 1/13/2025 9:47 AM Dictation workstation:   [id number]       Assessment/Plan   Assessment & Plan  Altered mental status, unspecified      Impression: The patient is a 64-year-old male with a history of multiple stroke risk factors who presents with numbness, confusion and dizziness as well as some agitation.  His neurological examination  is normal.  The differential diagnosis for his confusion includes influenza, hypertensive urgency, urinary tract infection, seizure, vitamin deficiency and cerebral infarction.    Plan: The patient needs an MRA of the neck and brain.  The patient needs an EEG, lipid panel and hemoglobin A1c.  The patient needs vitamin B12 supplementation.  The patient will need a Zio patch for 2 weeks.  The patient will need to be on Plavix 75 mg a day and aspirin 81 mg a day for 21 days.  After 21 days, the Plavix can be discontinued and aspirin can be continued at 81 mg a day.  The patient needs to continue stroke risk factor modification.   The patient needs tighter blood pressure control.  I would certainly treat any underlying factions and normalize any metabolic abnormalities.  The patient needs a PT, OT, social service, rehab and speech therapy consult.  The patient needs DVT prophylaxis.  The patient needs neurochecks as per protocol.  I will send the note to .   Thank you very much for sending me this very interesting consultation.  I discussed all these issues in detail with the patient and his wife who is in the room and answered all their questions.  I will continue to follow the patient while they are in the hospital.  The patient needs follow-up with their primary care doctor within 2 weeks of discharge.  On discharge, the patient will follow up with the neurology NP in the office in 4 months.      Shaggy Corcoran MD

## 2025-01-15 NOTE — H&P
"[facility] - GENERAL MEDICINE HISTORY AND PHYSICAL    History Obtained From (Primary Source): wife, patient  Collateral History (Secondary Sources): D/w ED provider, chart review    History Of Present Illness (HPI):  Cheng De Jesus \"Luther\" is a 64 y.o. male with PMHx s/f HTN presenting with altered mental status.  He was discharged 2 days ago for transient altered mental status and hypertensive emergency. His wife states he was fine on the day of discharge and this morning. The episode is similar to how he present to the hospital 2 days ago.  He did his normal morning routine but started having dizziness like the room is spinning and near syncope.  His wife reports that his anxiety has been high since his mom has been hospitalized. He became really confused and repeating that he has to see his mom at the hospital.  His mom was discharged yesterday. He referred that bus (ambulance) took him to the hospital to see his mom and kept repeating \"she's upstairs in room 2022, I have to go see her now\". He was reportedly agitated and became verbally violent on arrival to ED. His wife notes he took his blood pressure medications this morning. He had 2 episodes of nausea and NBNB emesis and diarrhea. He was diagnosed with Influenza A 2 days ago. Denies f/c, syncope, lateralizing or focal weakness, chest pain, shortness of breath, abdominal pain.    ED Course (Summary - please note all labs, imaging studies, and interventions noted below have been personally reviewed and/or interpreted on day of admission):   Vitals on presentation: 97.7, 56 bpm, 18 RR, 207/110, 100% on RA  Labs: CMP-glucose 103, potassium 2.9, calcium 5.5, BUN 2.4  CBC largely unremarkable  Cannabinoid positive  EKG: Sinus rhythm at 59 bpm, no acute ischemic changes  Imaging: CT head-no acute intracranial pathologic findings  CXR-no acute chest finding  Interventions: Ativan 1 mg, Geodon 10 mg, admission for further management    12-point ROS " reviewed and found to be negative aside from aforementioned positives in HPI and/or noted in dedicated ROS section below.     ED Course (From ED Provider):     Relevant Results  Results for orders placed or performed during the hospital encounter of 01/15/25 (from the past 24 hours)   POCT GLUCOSE   Result Value Ref Range    POCT Glucose 180 (H) 74 - 99 mg/dL   ECG 12 lead   Result Value Ref Range    Ventricular Rate 59 BPM    Atrial Rate 58 BPM    SD Interval 195 ms    QRS Duration 144 ms    QT Interval 423 ms    QTC Calculation(Bazett) 419 ms    P Axis 58 degrees    R Axis 85 degrees    T Axis 62 degrees    QRS Count 9 beats    Q Onset 249 ms    T Offset 461 ms    QTC Fredericia 420 ms   CBC and Auto Differential   Result Value Ref Range    WBC 4.5 4.4 - 11.3 x10*3/uL    nRBC 0.0 0.0 - 0.0 /100 WBCs    RBC 4.62 4.50 - 5.90 x10*6/uL    Hemoglobin 14.0 13.5 - 17.5 g/dL    Hematocrit 42.2 41.0 - 52.0 %    MCV 91 80 - 100 fL    MCH 30.3 26.0 - 34.0 pg    MCHC 33.2 32.0 - 36.0 g/dL    RDW 14.3 11.5 - 14.5 %    Platelets 166 150 - 450 x10*3/uL    Neutrophils % 60.8 40.0 - 80.0 %    Immature Granulocytes %, Automated 0.4 0.0 - 0.9 %    Lymphocytes % 23.6 13.0 - 44.0 %    Monocytes % 14.6 2.0 - 10.0 %    Eosinophils % 0.4 0.0 - 6.0 %    Basophils % 0.2 0.0 - 2.0 %    Neutrophils Absolute 2.70 1.20 - 7.70 x10*3/uL    Immature Granulocytes Absolute, Automated 0.02 0.00 - 0.70 x10*3/uL    Lymphocytes Absolute 1.05 (L) 1.20 - 4.80 x10*3/uL    Monocytes Absolute 0.65 0.10 - 1.00 x10*3/uL    Eosinophils Absolute 0.02 0.00 - 0.70 x10*3/uL    Basophils Absolute 0.01 0.00 - 0.10 x10*3/uL   Comprehensive metabolic panel   Result Value Ref Range    Glucose 103 (H) 74 - 99 mg/dL    Sodium 144 136 - 145 mmol/L    Potassium 2.9 (LL) 3.5 - 5.3 mmol/L    Chloride 119 (H) 98 - 107 mmol/L    Bicarbonate 20 (L) 21 - 32 mmol/L    Anion Gap 8 (L) 10 - 20 mmol/L    Urea Nitrogen 8 6 - 23 mg/dL    Creatinine 0.48 (L) 0.50 - 1.30 mg/dL    eGFR  >90 >60 mL/min/1.73m*2    Calcium 5.5 (LL) 8.6 - 10.3 mg/dL    Albumin 2.4 (L) 3.4 - 5.0 g/dL    Alkaline Phosphatase 30 (L) 33 - 136 U/L    Total Protein 3.8 (L) 6.4 - 8.2 g/dL    AST 12 9 - 39 U/L    Bilirubin, Total 0.3 0.0 - 1.2 mg/dL    ALT 11 10 - 52 U/L   Drug Screen, Urine   Result Value Ref Range    Amphetamine Screen, Urine Presumptive Negative Presumptive Negative    Barbiturate Screen, Urine Presumptive Negative Presumptive Negative    Benzodiazepines Screen, Urine Presumptive Negative Presumptive Negative    Cannabinoid Screen, Urine Presumptive Positive (A) Presumptive Negative    Cocaine Metabolite Screen, Urine Presumptive Negative Presumptive Negative    Fentanyl Screen, Urine Presumptive Negative Presumptive Negative    Opiate Screen, Urine Presumptive Negative Presumptive Negative    Oxycodone Screen, Urine Presumptive Negative Presumptive Negative    PCP Screen, Urine Presumptive Negative Presumptive Negative    Methadone Screen, Urine Presumptive Negative Presumptive Negative   Acute Toxicology Panel, Blood   Result Value Ref Range    Acetaminophen <10.0 10.0 - 30.0 ug/mL    Salicylate  <3 4 - 20 mg/dL    Alcohol <10 <=10 mg/dL      XR chest 1 view    Result Date: 1/15/2025  STUDY: Chest Radiograph;  1/15/2025 1:58PM INDICATION: Shortness of breath. COMPARISON: XR Chest: 2/2/2022 ACCESSION NUMBER(S): [id number] ORDERING CLINICIAN: WALTER SALEH TECHNIQUE:  Frontal chest was obtained at 13:57 hours. FINDINGS: Cardiac silhouette normal in size. No significant pulmonary vascular congestion. No significant pleural effusion. No visible pneumothorax.     No acute radiographic chest finding. Signed by Colin Lucero MD    CT head wo IV contrast    Result Date: 1/15/2025  Interpreted By:  Kenneth Leyva, STUDY: CT HEAD WO IV CONTRAST; 1/15/2025 1:34 pm   INDICATION: Signs/Symptoms:Confusion.   COMPARISON: 01/13/2025   ACCESSION NUMBER(S): [id number]   ORDERING CLINICIAN: WALTER SALEH   TECHNIQUE:  A helical acquisition data was obtained.   One or more of the following dose reduction techniques were used: Automated exposure control Adjustment of the mA and/or kV according to patient size, and/or use of iterative reconstruction technique.   FINDINGS: Intracranial findings: There is no evidence for intracranial hemorrhage or mass effect.   Paranasal sinuses and temporal bone findings:  The visualized portions of the paranasal sinuses, mastoid air cells, and middle ear cavities are clear.   Orbital findings: The visualized portions of the orbits are unremarkable.       No acute intracranial pathologic findings are identified. There is no interval change when compared to the previous examination.   MACRO: none   Signed by: Kenneth Leyva 1/15/2025 1:46 PM Dictation workstation:   [id number]    ECG 12 lead    Result Date: 1/15/2025  Sinus rhythm Right bundle branch block    Transthoracic Echo (TTE) Complete    Result Date: 1/13/2025              [place] [address]      Phone [phone] Fax [phone] TRANSTHORACIC ECHOCARDIOGRAM REPORT Patient Name:       CEE Merritt Physician:    [id number] Lazaro Wan MD Study Date:         1/13/2025            Ordering Provider:    [id number] FAREED STEELE MRN/PID:            [id number]             Fellow: Accession#:         [id number]         Nurse:                Emergency RN Date of Birth/Age:  1960 / 64 years  Sonographer:          Lanny Lvoe [facility] Gender Assigned at  M                    Additional Staff: Birth: Height:             187.96 cm            Admit Date:           1/13/2025 Weight:             86.18 kg             Admission Status:     Inpatient -                                                                Routine BSA / BMI:          2.13 m2 / 24.39      Department Location:  [facility]                     kg/m2 Blood Pressure:  164 /101 mmHg Study Type:    TRANSTHORACIC ECHO (TTE) COMPLETE Diagnosis/ICD: Other transient cerebral ischemic attacks and related                syndromes-G45.8 Indication:    Altered Mental Status CPT Codes:     Echo Complete w Full [id number] Patient History: Pertinent History: HTN. Study Detail: The following Echo studies were performed: 2D, M-Mode, Doppler and               color flow.  PHYSICIAN INTERPRETATION: Left Ventricle: Left ventricular ejection fraction is normal, calculated by Box's biplane at 63%. There are no regional wall motion abnormalities. The left ventricular cavity size is normal. There is normal septal and normal posterior left ventricular wall thickness. Spectral Doppler shows a normal pattern of left ventricular diastolic filling. Left Atrium: The left atrium is normal in size. A bubble study using agitated saline was performed. Bubble study is negative. Right Ventricle: The right ventricle is normal in size. There is normal right ventricular global systolic function. Right Atrium: The right atrium is normal in size. Aortic Valve: The aortic valve is trileaflet. The aortic valve dimensionless index is 0.97. There is no evidence of aortic valve regurgitation. The peak instantaneous gradient of the aortic valve is 9 mmHg. The mean gradient of the aortic valve is 4 mmHg. Mitral Valve: The mitral valve is normal in structure. There is no evidence of mitral valve regurgitation. Tricuspid Valve: The tricuspid valve is structurally normal. There is trace tricuspid regurgitation. Pulmonic Valve: The pulmonic valve is structurally normal. There is no indication of pulmonic valve regurgitation. Pericardium: No pericardial effusion noted. There is a pericardial fat pad present. Aorta: The aortic root is abnormal. There is mild dilatation of the aortic root.  CONCLUSIONS:  1. Left ventricular ejection fraction is normal, calculated by Box's biplane at 63%.  2. There is normal right  ventricular global systolic function. QUANTITATIVE DATA SUMMARY:  2D MEASUREMENTS:           Normal Ranges: LAs:             3.03 cm   (2.7-4.0cm) IVSd:            0.89 cm   (0.6-1.1cm) LVPWd:           0.93 cm   (0.6-1.1cm) LVIDd:           4.57 cm   (3.9-5.9cm) LVIDs:           3.07 cm LV Mass Index:   65.1 g/m2 LV % FS          32.9 %  LA VOLUME:                    Normal Ranges: LA Vol A4C:        53.9 ml    (22+/-6mL/m2) LA Vol A2C:        58.8 ml LA Vol BP:         56.8 ml LA Vol Index A4C:  25.3 ml/m2 LA Vol Index A2C:  27.6 ml/m2 LA Vol Index BP:   26.7 ml/m2 LA Area A4C:       18.5 cm2 LA Area A2C:       19.5 cm2 LA Major Axis A4C: 5.4 cm LA Major Axis A2C: 5.5 cm LA Volume Index:   26.7 ml/m2 LA Vol A4C:        52.8 ml LA Vol A2C:        57.3 ml LA Vol Index BSA:  25.9 ml/m2  RA VOLUME BY A/L METHOD:          Normal Ranges: RA Area A4C:             15.2 cm2  AORTA MEASUREMENTS:         Normal Ranges: Ao Sinus, d:        4.00 cm (2.1-3.5cm) Ao STJ, d:          3.10 cm (1.7-3.4cm) Asc Ao, d:          3.20 cm (2.1-3.4cm)  LV SYSTOLIC FUNCTION BY 2D PLANIMETRY (MOD):                      Normal Ranges: EF-A4C View:    63 % (>=55%) EF-A2C View:    64 % EF-Biplane:     63 % LV EF Reported: 63 %  LV DIASTOLIC FUNCTION:           Normal Ranges: MV Peak E:             0.61 m/s  (0.7-1.2 m/s) MV Peak A:             0.63 m/s  (0.42-0.7 m/s) E/A Ratio:             0.97      (1.0-2.2) MV e'                  0.102 m/s (>8.0) MV lateral e'          0.12 m/s MV medial e'           0.08 m/s E/e' Ratio:            6.01      (<8.0)  MITRAL VALVE:          Normal Ranges: MV DT:        246 msec (150-240msec)  AORTIC VALVE:                     Normal Ranges: AoV Vmax:                1.49 m/s (<=1.7m/s) AoV Peak P.9 mmHg (<20mmHg) AoV Mean P.2 mmHg (1.7-11.5mmHg) LVOT Max Michele:            1.51 m/s (<=1.1m/s) AoV VTI:                 28.06 cm (18-25cm) LVOT VTI:                27.30 cm LVOT  Diameter:           2.15 cm  (1.8-2.4cm) AoV Area, VTI:           3.53 cm2 (2.5-5.5cm2) AoV Area,Vmax:           3.69 cm2 (2.5-4.5cm2) AoV Dimensionless Index: 0.97  RIGHT VENTRICLE: RV Basal 4.02 cm RV Mid   3.50 cm RV Major 7.4 cm TAPSE:   25.0 mm RV s'    0.16 m/s  TRICUSPID VALVE/RVSP:          Normal Ranges: Peak TR Velocity:     2.06 m/s RV Syst Pressure:     20 mmHg  (< 30mmHg) IVC Diam:             1.24 cm  AORTA: Asc Ao Diam 3.21 cm  [id number] Lazaro Wan MD Electronically signed on 1/13/2025 at 5:01:20 PM  ** Final **     MR brain wo IV contrast    Result Date: 1/13/2025  Interpreted By:  Boo Greene, STUDY: MR BRAIN WO IV CONTRAST   INDICATION: Signs/Symptoms:dizziness   COMPARISON: Head CT 01/13/2025.   ACCESSION NUMBER(S): [id number]   ORDERING CLINICIAN: FAREED STEELE   TECHNIQUE: Multi-planar multi-sequential MR imaging of the brain was performed without intravenous contrast.   FINDINGS:   No acute infarction, intracranial hemorrhage or mass. Mild scattered FLAIR hyperintense foci predominantly within the subcortical and deep white matter of bilateral frontal lobes.   No hydrocephalus.  No extra-axial fluid collections.   The skull base flow voids are present.   The visualized intraorbital contents are normal.   The imaged portions of the paranasal sinuses are clear. The mastoid air cells are clear.   Partially visualized T2 hyperintense curvilinear structure along the left masseter muscle (series 401, image 1).       No acute intracranial abnormality.   Mild scattered FLAIR hyperintense foci predominantly within the subcortical and deep white matter of bilateral frontal lobes, which may represent microangiopathic disease versus sequela of migraines. Demyelinating process is thought to be less likely.   Partially visualized T2 hyperintense curvilinear structure along the left masseter muscle, which may represent parotid sialocele.   Signed by: Boo Greene 1/13/2025 3:55 PM Dictation workstation:    [id number]    Scheduled medications:  calcium gluconate, 2 g, intravenous, Once  enoxaparin, 40 mg, subcutaneous, q24h  escitalopram, 20 mg, oral, Daily  hydrALAZINE, 10 mg, intravenous, Once  metoprolol succinate XL, 50 mg, oral, Daily  [START ON 1/16/2025] pantoprazole, 40 mg, oral, Daily before breakfast   Or  [START ON 1/16/2025] pantoprazole, 40 mg, intravenous, Daily before breakfast  potassium chloride, 20 mEq, oral, BID  potassium chloride CR, 20 mEq, oral, Once  valsartan, 320 mg, oral, Daily  ziprasidone, 10 mg, intramuscular, Once      Continuous medications:     PRN medications:  PRN medications: acetaminophen, melatonin, ondansetron     Past Medical History  He has a past medical history of Cancer (Multi) and Hypertension.    He has no past medical history of CHF (congestive heart failure), Diabetes mellitus (Multi), or Renal insufficiency.    Surgical History  He has no past surgical history on file.     Social History  He reports that he has been smoking cigarettes. He has never used smokeless tobacco. He reports current alcohol use. He reports that he does not use drugs.    Family History  No family history on file.    Allergies  Lisinopril, Morphine, and Valium [diazepam]    Code Status  Full Code     Review of Systems   Constitutional:  Negative for chills, fatigue and fever.   Respiratory:  Negative for cough, chest tightness, shortness of breath and wheezing.    Cardiovascular:  Negative for chest pain, palpitations and leg swelling.   Gastrointestinal:  Positive for diarrhea, nausea and vomiting. Negative for abdominal pain and constipation.   Genitourinary:  Negative for flank pain and hematuria.   Musculoskeletal:  Negative for back pain and joint swelling.   Skin:  Negative for rash and wound.   Neurological:  Positive for dizziness. Negative for tremors, syncope, weakness and headaches.       Last Recorded Vitals  BP (!) 144/92   Pulse 54   Temp 36.5 °C (97.7 °F)   Resp 18   Wt 86.2 kg  (190 lb)   SpO2 100%      Physical Exam:  Vital signs and nursing notes reviewed.   Constitutional: Pleasant and cooperative. Laying in bed in no acute distress. Conversant.   Skin: Warm and dry; no obvious lesions, rashes, pallor, or jaundice.   Eyes: EOMI. Anicteric sclera.   ENT: Mucous membranes moist; no obvious injury or deformity appreciated.   Head and Neck: Normocephalic, atraumatic. ROM preserved. Trachea midline. No appreciable JVD.   Respiratory: Nonlabored on RA. Lungs clear to auscultation bilaterally without obvious adventitious sounds. Chest rise is equal.  Cardiovascular: RRR. No gross murmur, gallop, or rub. Extremities are warm and well-perfused with good capillary refill (< 3 seconds). No chest wall tenderness.   GI: Abdomen soft, nontender, nondistended. No obvious organomegaly appreciated. Bowel sounds are present.  : No CVA tenderness.   MSK: No gross abnormalities appreciated. No limitations to AROM/PROM appreciated.   Extremities: No cyanosis, edema, or clubbing evident. Neurovascularly intact.   Neuro: A&Ox2, thinks the year is 2022 and president is Govind. Diplopia and cannot do finger-to-nose.  No facial asymmetry, no obvious dysarthria or aphasia noted.  Sensory intact.  No gaze palsy.  Strength 5 out of 5 in bilateral upper and lower extremities.  No motor drift.  Psych: Appropriate mood and behavior.    Assessment/Plan     64 y.o. male with PMHx s/f HTN presenting with altered mental status.    Altered mental status (?Agitated delirium/catatonia)  -although influenza A positive, less likely metabolic encephalopathy - leukocytosis, CXR, UA on 1/13 negative  -CTH negative  -MRI: no acute changes; mild scattered FLAIR hyperintense foci in subcortical and deep white matter of bilateral frontal lobes, which may represent microangiopathic disease versus sequela of migraines  -rec'd ativan 1 mg, geodon 10 mg in ED  -neurology consult    Hypokalemia, Hypocalcemia  -replacements ordered,  replenish as necessary    HTN  -Initially hypertensive at 207/110 >> /92 at admit  -continue home Toprol-XL, valsartan    Diet: Cardiac  DVT Prophylaxis: Lovenox SQ   Code Status: Full code  Case Discussed With: ED provider  Additional Sources Reviewed: Prior hospitalization notes    Anticipated Length of Stay (LOS): Observation for AMS workup, neurology consult     Jennifer Garces PA-C    Dragon dictation software was used to dictate this note and thus there may be minor errors in translation/transcription including garbled speech or misspellings. Please contact for clarification if needed.

## 2025-01-16 ENCOUNTER — APPOINTMENT (OUTPATIENT)
Dept: CARDIOLOGY | Facility: HOSPITAL | Age: 65
DRG: 640 | End: 2025-01-16
Payer: COMMERCIAL

## 2025-01-16 ENCOUNTER — APPOINTMENT (OUTPATIENT)
Dept: RADIOLOGY | Facility: HOSPITAL | Age: 65
DRG: 640 | End: 2025-01-16
Payer: COMMERCIAL

## 2025-01-16 ENCOUNTER — HOSPITAL ENCOUNTER (OUTPATIENT)
Dept: NEUROLOGY | Facility: HOSPITAL | Age: 65
Setting detail: OBSERVATION
Discharge: HOME | End: 2025-01-16
Payer: COMMERCIAL

## 2025-01-16 PROBLEM — R41.82 ALTERED MENTAL STATUS, UNSPECIFIED ALTERED MENTAL STATUS TYPE: Status: ACTIVE | Noted: 2025-01-16

## 2025-01-16 LAB
ALBUMIN SERPL BCP-MCNC: 3.6 G/DL (ref 3.4–5)
ALP SERPL-CCNC: 39 U/L (ref 33–136)
ALT SERPL W P-5'-P-CCNC: 18 U/L (ref 10–52)
ANION GAP SERPL CALC-SCNC: 10 MMOL/L (ref 10–20)
AST SERPL W P-5'-P-CCNC: 17 U/L (ref 9–39)
BILIRUB SERPL-MCNC: 0.5 MG/DL (ref 0–1.2)
BUN SERPL-MCNC: 12 MG/DL (ref 6–23)
CALCIUM SERPL-MCNC: 8.5 MG/DL (ref 8.6–10.3)
CHLORIDE SERPL-SCNC: 106 MMOL/L (ref 98–107)
CO2 SERPL-SCNC: 28 MMOL/L (ref 21–32)
CREAT SERPL-MCNC: 0.57 MG/DL (ref 0.5–1.3)
EGFRCR SERPLBLD CKD-EPI 2021: >90 ML/MIN/1.73M*2
ERYTHROCYTE [DISTWIDTH] IN BLOOD BY AUTOMATED COUNT: 14.2 % (ref 11.5–14.5)
EST. AVERAGE GLUCOSE BLD GHB EST-MCNC: 123 MG/DL
GLUCOSE BLD MANUAL STRIP-MCNC: 108 MG/DL (ref 74–99)
GLUCOSE BLD MANUAL STRIP-MCNC: 141 MG/DL (ref 74–99)
GLUCOSE BLD MANUAL STRIP-MCNC: 148 MG/DL (ref 74–99)
GLUCOSE SERPL-MCNC: 121 MG/DL (ref 74–99)
HBA1C MFR BLD: 5.9 %
HCT VFR BLD AUTO: 40.4 % (ref 41–52)
HGB BLD-MCNC: 13.6 G/DL (ref 13.5–17.5)
HOLD SPECIMEN: NORMAL
MCH RBC QN AUTO: 30.5 PG (ref 26–34)
MCHC RBC AUTO-ENTMCNC: 33.7 G/DL (ref 32–36)
MCV RBC AUTO: 91 FL (ref 80–100)
NRBC BLD-RTO: 0 /100 WBCS (ref 0–0)
PLATELET # BLD AUTO: 149 X10*3/UL (ref 150–450)
POTASSIUM SERPL-SCNC: 4 MMOL/L (ref 3.5–5.3)
PROT SERPL-MCNC: 5.9 G/DL (ref 6.4–8.2)
RBC # BLD AUTO: 4.46 X10*6/UL (ref 4.5–5.9)
SODIUM SERPL-SCNC: 140 MMOL/L (ref 136–145)
WBC # BLD AUTO: 3.7 X10*3/UL (ref 4.4–11.3)

## 2025-01-16 PROCEDURE — 70450 CT HEAD/BRAIN W/O DYE: CPT

## 2025-01-16 PROCEDURE — 2500000004 HC RX 250 GENERAL PHARMACY W/ HCPCS (ALT 636 FOR OP/ED): Performed by: REGISTERED NURSE

## 2025-01-16 PROCEDURE — 70450 CT HEAD/BRAIN W/O DYE: CPT | Performed by: STUDENT IN AN ORGANIZED HEALTH CARE EDUCATION/TRAINING PROGRAM

## 2025-01-16 PROCEDURE — 80053 COMPREHEN METABOLIC PANEL: CPT

## 2025-01-16 PROCEDURE — 70551 MRI BRAIN STEM W/O DYE: CPT | Performed by: RADIOLOGY

## 2025-01-16 PROCEDURE — 2500000002 HC RX 250 W HCPCS SELF ADMINISTERED DRUGS (ALT 637 FOR MEDICARE OP, ALT 636 FOR OP/ED): Performed by: INTERNAL MEDICINE

## 2025-01-16 PROCEDURE — 99233 SBSQ HOSP IP/OBS HIGH 50: CPT | Performed by: NURSE PRACTITIONER

## 2025-01-16 PROCEDURE — 2500000001 HC RX 250 WO HCPCS SELF ADMINISTERED DRUGS (ALT 637 FOR MEDICARE OP): Performed by: NURSE PRACTITIONER

## 2025-01-16 PROCEDURE — 96372 THER/PROPH/DIAG INJ SC/IM: CPT | Performed by: INTERNAL MEDICINE

## 2025-01-16 PROCEDURE — 1200000002 HC GENERAL ROOM WITH TELEMETRY DAILY

## 2025-01-16 PROCEDURE — 95816 EEG AWAKE AND DROWSY: CPT

## 2025-01-16 PROCEDURE — 2500000001 HC RX 250 WO HCPCS SELF ADMINISTERED DRUGS (ALT 637 FOR MEDICARE OP)

## 2025-01-16 PROCEDURE — 85027 COMPLETE CBC AUTOMATED: CPT

## 2025-01-16 PROCEDURE — 2500000004 HC RX 250 GENERAL PHARMACY W/ HCPCS (ALT 636 FOR OP/ED): Performed by: NURSE PRACTITIONER

## 2025-01-16 PROCEDURE — 70551 MRI BRAIN STEM W/O DYE: CPT

## 2025-01-16 PROCEDURE — 95816 EEG AWAKE AND DROWSY: CPT | Performed by: PSYCHIATRY & NEUROLOGY

## 2025-01-16 PROCEDURE — 2500000004 HC RX 250 GENERAL PHARMACY W/ HCPCS (ALT 636 FOR OP/ED): Performed by: INTERNAL MEDICINE

## 2025-01-16 PROCEDURE — 97165 OT EVAL LOW COMPLEX 30 MIN: CPT | Mod: GO

## 2025-01-16 PROCEDURE — 96372 THER/PROPH/DIAG INJ SC/IM: CPT | Performed by: NURSE PRACTITIONER

## 2025-01-16 PROCEDURE — 82947 ASSAY GLUCOSE BLOOD QUANT: CPT

## 2025-01-16 PROCEDURE — 99233 SBSQ HOSP IP/OBS HIGH 50: CPT | Performed by: INTERNAL MEDICINE

## 2025-01-16 PROCEDURE — 96376 TX/PRO/DX INJ SAME DRUG ADON: CPT

## 2025-01-16 PROCEDURE — 97162 PT EVAL MOD COMPLEX 30 MIN: CPT | Mod: GP

## 2025-01-16 RX ORDER — HYDRALAZINE HYDROCHLORIDE 20 MG/ML
5 INJECTION INTRAMUSCULAR; INTRAVENOUS ONCE
Status: COMPLETED | OUTPATIENT
Start: 2025-01-16 | End: 2025-01-16

## 2025-01-16 RX ORDER — METOPROLOL SUCCINATE 25 MG/1
25 TABLET, EXTENDED RELEASE ORAL DAILY
Status: DISCONTINUED | OUTPATIENT
Start: 2025-01-17 | End: 2025-01-19 | Stop reason: HOSPADM

## 2025-01-16 RX ORDER — ENOXAPARIN SODIUM 100 MG/ML
40 INJECTION SUBCUTANEOUS EVERY 24 HOURS
Status: DISCONTINUED | OUTPATIENT
Start: 2025-01-16 | End: 2025-01-19 | Stop reason: HOSPADM

## 2025-01-16 RX ORDER — OLANZAPINE 10 MG/2ML
10 INJECTION, POWDER, FOR SOLUTION INTRAMUSCULAR ONCE
Status: COMPLETED | OUTPATIENT
Start: 2025-01-16 | End: 2025-01-16

## 2025-01-16 RX ORDER — QUETIAPINE FUMARATE 25 MG/1
50 TABLET, FILM COATED ORAL NIGHTLY
Status: DISCONTINUED | OUTPATIENT
Start: 2025-01-16 | End: 2025-01-19 | Stop reason: HOSPADM

## 2025-01-16 RX ORDER — LORAZEPAM 2 MG/ML
0.5 INJECTION INTRAMUSCULAR EVERY 8 HOURS PRN
Status: DISCONTINUED | OUTPATIENT
Start: 2025-01-16 | End: 2025-01-16

## 2025-01-16 RX ADMIN — CLOPIDOGREL 75 MG: 75 TABLET ORAL at 09:23

## 2025-01-16 RX ADMIN — LORAZEPAM 0.5 MG: 2 INJECTION INTRAMUSCULAR; INTRAVENOUS at 10:44

## 2025-01-16 RX ADMIN — ENOXAPARIN SODIUM 40 MG: 40 INJECTION SUBCUTANEOUS at 15:24

## 2025-01-16 RX ADMIN — PANTOPRAZOLE SODIUM 40 MG: 40 TABLET, DELAYED RELEASE ORAL at 05:16

## 2025-01-16 RX ADMIN — ASPIRIN 81 MG: 81 TABLET, COATED ORAL at 09:24

## 2025-01-16 RX ADMIN — CYANOCOBALAMIN 1000 MCG: 1000 INJECTION, SOLUTION INTRAMUSCULAR; SUBCUTANEOUS at 09:23

## 2025-01-16 RX ADMIN — ESCITALOPRAM OXALATE 20 MG: 10 TABLET ORAL at 09:23

## 2025-01-16 RX ADMIN — OLANZAPINE 10 MG: 10 INJECTION, POWDER, FOR SOLUTION INTRAMUSCULAR at 11:43

## 2025-01-16 RX ADMIN — HYDRALAZINE HYDROCHLORIDE 5 MG: 20 INJECTION INTRAMUSCULAR; INTRAVENOUS at 02:19

## 2025-01-16 RX ADMIN — VALSARTAN 320 MG: 80 TABLET, FILM COATED ORAL at 09:23

## 2025-01-16 ASSESSMENT — COGNITIVE AND FUNCTIONAL STATUS - GENERAL
CLIMB 3 TO 5 STEPS WITH RAILING: A LOT
DRESSING REGULAR UPPER BODY CLOTHING: A LITTLE
TURNING FROM BACK TO SIDE WHILE IN FLAT BAD: A LITTLE
MOBILITY SCORE: 17
STANDING UP FROM CHAIR USING ARMS: A LITTLE
PERSONAL GROOMING: A LITTLE
WALKING IN HOSPITAL ROOM: A LOT
EATING MEALS: A LITTLE
DAILY ACTIVITIY SCORE: 17
TOILETING: A LITTLE
DRESSING REGULAR LOWER BODY CLOTHING: A LITTLE
MOVING TO AND FROM BED TO CHAIR: A LITTLE
HELP NEEDED FOR BATHING: A LOT

## 2025-01-16 ASSESSMENT — PAIN - FUNCTIONAL ASSESSMENT
PAIN_FUNCTIONAL_ASSESSMENT: 0-10

## 2025-01-16 ASSESSMENT — ACTIVITIES OF DAILY LIVING (ADL)
LACK_OF_TRANSPORTATION: NO
BATHING_ASSISTANCE: MODERATE
ADL_ASSISTANCE: INDEPENDENT

## 2025-01-16 ASSESSMENT — PAIN SCALES - GENERAL
PAINLEVEL_OUTOF10: 0 - NO PAIN

## 2025-01-16 NOTE — CARE PLAN
The patient's goals for the shift include no injuries    The clinical goals for the shift include no injuries    Over the shift, the patient did not make progress toward the following goals. Barriers to progression include . Recommendations to address these barriers include

## 2025-01-16 NOTE — PROGRESS NOTES
"[facility] Progress Note    Luther De Jesus is a 64 y.o. male on day 0 of admission presenting with Altered mental status, unspecified.  Subjective   Pt seen again this afternoon. Ambulatory in room. States he is feeling \"ok\" today. Denies any current nausea or discomfort.     Per chart review, patient did get ativan and zyprexa both this morning. Minimal RN charting available but seemingly became impulsive and anxious with facial numbness around 1020 am.        Objective     Vitals:    01/16/25 0155 01/16/25 0538 01/16/25 0935 01/16/25 1020   BP:  (!) 163/93 163/85 (!) 178/94   BP Location:  Left arm Left arm Left arm   Patient Position:  Lying Lying Lying   Pulse: (!) 45 51 50 57   Resp:  16 15 18   Temp:  36.7 °C (98 °F) 36.7 °C (98.1 °F)    TempSrc:  Temporal Temporal    SpO2:  93% 95% 96%   Weight:       Height:             Physical Exam  Vitals reviewed.   Neurological:      Motor: Motor strength is normal.  Psychiatric:         Speech: Speech normal.       Neurological Exam  Mental Status  Awake, alert and oriented to person, place and time. Speech is normal. Language is fluent with no aphasia.    Cranial Nerves  CN II-XII grossly intact.    Motor  Normal muscle bulk throughout. No fasciculations present. Normal muscle tone. No abnormal involuntary movements. Strength is 5/5 throughout all four extremities.    Sensory  Light touch is normal in upper and lower extremities.     Gait  Casual gait is normal including stance, stride, and arm swing.      Scheduled medications  aspirin, 81 mg, oral, Daily  atorvastatin, 40 mg, oral, Nightly  clopidogrel, 75 mg, oral, Daily  cyanocobalamin, 1,000 mcg, intramuscular, Daily  escitalopram, 20 mg, oral, Daily  [START ON 1/17/2025] metoprolol succinate XL, 25 mg, oral, Daily  pantoprazole, 40 mg, oral, Daily before breakfast   Or  pantoprazole, 40 mg, intravenous, Daily before breakfast  valsartan, 320 mg, oral, Daily      Continuous medications     PRN medications  PRN " medications: acetaminophen, LORazepam, melatonin, ondansetron, oxygen     Lab Results   Component Value Date    WBC 3.7 (L) 01/16/2025    RBC 4.46 (L) 01/16/2025    HGB 13.6 01/16/2025    HCT 40.4 (L) 01/16/2025     (L) 01/16/2025     01/16/2025    K 4.0 01/16/2025     01/16/2025    BUN 12 01/16/2025    CREATININE 0.57 01/16/2025    EGFR >90 01/16/2025    CALCIUM 8.5 (L) 01/16/2025    ALKPHOS 39 01/16/2025    AST 17 01/16/2025    ALT 18 01/16/2025    MG 1.83 01/13/2025    OPVFQOBQ48 155 (L) 01/15/2025    HGBA1C 5.9 (H) 01/14/2025    LDLCALC 39 01/15/2025    CHOL 83 01/15/2025    HDL 31.3 01/15/2025    TRIG 65 01/15/2025    TSH 0.71 01/15/2025    TSH 2.27 11/01/2024       Below CT and MRI images and reports have been personally reviewed in PACS, agree with interpretations.    CT head wo IV contrast 01/16/2025    Narrative  Interpreted By:  Haris Dodge,  STUDY:  CT HEAD WO IV CONTRAST;  1/16/2025 12:18 pm    INDICATION:  Signs/Symptoms:PW encephalopathy. Resolved. Now recuring without  explanation.      COMPARISON:  MRI brain 01/13/2025, CT head without contrast 01/15/2025    ACCESSION NUMBER(S):  [id number]    ORDERING CLINICIAN:  MAGGIE BOWLES    TECHNIQUE:  Noncontrast axial CT images of head were obtained with coronal and  sagittal reconstructed images.    FINDINGS:  BRAIN PARENCHYMA: There is a subcentimeter hypodensity within the  posterior limb of the right internal capsule not seen on prior recent  head CT or MRI. No acute intraparenchymal hemorrhage or parenchymal  evidence of acute large territory ischemic infarct. Gray-white matter  distinction is preserved. No mass-effect.    VENTRICLES and EXTRA-AXIAL SPACES:  No acute extra-axial or  intraventricular hemorrhage. No effacement of cerebral sulci. The  ventricles and sulci are age-concordant.    PARANASAL SINUSES/MASTOIDS:  No hemorrhage or air-fluid levels within  the visualized paranasal sinuses. The mastoids are well  aerated.    CALVARIUM/ORBITS:  No skull fracture.  The orbits and globes are  intact to the extent visualized.    EXTRACRANIAL SOFT TISSUES: No discernible abnormality.    Impression  Compared to CT head 01/15/2025 and MRI brain 01/13/2025, the new  subcentimeter hypodensity within the posterior limb of right internal  capsule the raising the possibly of an acute lacunar infarct. This  would be expected to cause left-sided motor weakness and clinical  correlation is recommended.    No new acute intracranial abnormality otherwise.    MACRO:  None.    Signed by: Haris Dodge 1/16/2025 12:43 PM  Dictation workstation:   [id number]      MR angio head wo IV contrast 01/15/2025    Narrative  Interpreted By:  Shaggy Taylor,  STUDY:  MR ANGIO HEAD WO IV CONTRAST;  1/15/2025 6:51 pm    INDICATION:  Signs/Symptoms:stroke like symptoms.      COMPARISON:  MRI brain 01/13/2025.    ACCESSION NUMBER(S):  [id number]    ORDERING CLINICIAN:  EUGENE BURCIAGA    TECHNIQUE:  Time-of-flight MRA of the head was performed. The images were  reviewed as source images and maximum intensity projections.    FINDINGS:  No intracranial major arterial occlusion. No aneurysms. No vascular  malformations detected.    Impression  1.  No evidence for large branch vessel cutoffs of the visualized  intracranial arterial vasculature.  2.  No definite aneurysm.    MACRO:  None    Signed by: Shaggy Taylor 1/15/2025 7:15 PM  Dictation workstation:   [id number]      MR angio neck wo IV contrast 01/15/2025    Narrative  Interpreted By:  Shaggy Taylor,  STUDY:  MR ANGIO NECK WO IV CONTRAST;  1/15/2025 6:51 pm    INDICATION:  Signs/Symptoms:stroke like symptoms.      COMPARISON:  CT head today. MR brain 01/13/2025.    ACCESSION NUMBER(S):  [id number]    ORDERING CLINICIAN:  EUGENE BURCIAGA    TECHNIQUE:  Time of flight MRA of the neck was performed. The images were  reviewed as source images and maximum intensity projections.    FINDINGS:  The  source images are mildly degraded by artifact.    Right carotid vessels:  There is expected flow signal in the  visualized portion of the common carotid artery.  No significant  stenosis at the carotid bifurcation. The internal carotid artery in  the neck demonstrates expected flow signal.    Left carotid vessels:   There is expected flow signal in the  visualized portion of the common carotid artery.  No significant  stenosis at the carotid bifurcation. The internal carotid artery in  the neck demonstrates expected flow signal.    Vertebral vessels:   The visualized segments of the cervical  vertebral arteries demonstrate expected flow signal.    Impression  No evidence of significant stenosis on MRA of the neck.    MACRO:  None    Signed by: Shaggy Taylor 1/15/2025 7:11 PM  Dictation workstation:   [id number]      NIH Stroke Scale  1A. Level of Consciousness: Alert, Keenly Responsive  1B. Ask Month and Age: Both Questions Right (unable to answer corrrect year or situation, increased confusion)  1C. Blink Eyes & Squeeze Hands: Performs Both Tasks  2. Best Gaze: Normal  3. Visual: No Visual Loss  4. Facial Palsy: Normal Symmetrical Movements  5A. Motor - Left Arm: No Drift  5B. Motor - Right Arm: No Drift  6A. Motor - Left Leg: No Drift  6B. Motor - Right Leg: No Drift  7. Limb Ataxia: Absent  8. Sensory Loss: Normal  9. Best Language: No Aphasia  10. Dysarthria: Normal  11. Extinction and Inattention: No Abnormality  NIH Stroke Scale: 0          Ariella Coma Scale  Best Eye Response: Spontaneous  Best Verbal Response: Oriented  Best Motor Response: Follows commands  Ariella Coma Scale Score: 15        Assessment/Plan      Assessment & Plan  Altered mental status, unspecified      IMPRESSION:  Cheng De Jesus is a 64 y.o. male with history of HTN presented for confusion, HTN and R hand/face tingling. Pt was initially admitted 1/13-1/14/25 for these symptoms. His mother was also admitted to the hospital with acute  influenza A infection. He presented back to the ED on 1/15/25 for more confusion with repeat tingling to RUE and face reported as well. BP on arrival was 207/110. Not on any antiplatelets or statins prior to arrival  HCT 1/15/25 - no acute findings  MRI brain 1/13/25 - no acute findings, non-specific WM disease  MRA head/neck - no significant stenosis or LVO  Repeat HCT today 1/16/25 - new subcentimeter hypodensity in posterior limb of R internal capsule not present on HCT yesterday or MRI 3 days ago.   B12 low at 155  Lipid panel with LDL 39, CHOL 83 and TG 65  Folate and TSH wnl  A1C 5.9%  K 2.9 and Ca 5.5 on this admission  TTE 1/13/25 - normal EF and LA size, negative bubble study    Acute encephalopathy  Likely multifactorial related to influenza, HTN, and metabolic abnormalities.  Sensory alteration to R arm and face, resolved, treat as TIA  Influenza A positive  B12 deficiency  HTN urgency  Abnormal CT brain  New finding in R internal capsule is incidental - would not cause the episodes of sensory loss to arm and face on R side. Also not likely reason for his encephalopathy.   Hypokalemia, improved  Hypocalcemia, improved    RECOMMENDATIONS:  Continue supportive care  Continue B12 supplementation  Delirium precautions  Will repeat MRI brain wo contrast for new finding found on CT today  Continue 81 mg aspirin daily with food and Plavix x 3 weeks then stop Plavix and stay on aspirin.   Continue HI statin daily  Continue to monitor and manage vascular risk factors.  Permissive HTN for 48 hours.   Continue to optimize and improve metabolic abnormalities.     Will continue to follow.        I personally spent 55 minutes today, exclusive of procedures, providing care for this patient, including preparation, face to face time, documentation and other services such as review of medical records, diagnostic result, patient education, counseling, coordination of care as specified in the encounter.    Jovita Zapata,  APRN-CNP

## 2025-01-16 NOTE — PROGRESS NOTES
"Physical Therapy    Physical Therapy Evaluation    Patient Name: Cheng De Jesus \"Luther\"  MRN: [id number]  Department: [facility] E OBS  Room: [address] [place], [place] [address]  Today's Date: 1/16/2025   Time Calculation  Start Time: 1421  Stop Time: 1439  Time Calculation (min): 18 min    Assessment/Plan   PT Assessment  PT Assessment Results: Decreased strength, Decreased endurance, Impaired balance, Decreased mobility, Decreased safety awareness  Rehab Prognosis: Fair  Barriers to Discharge Home: Caregiver assistance, Cognition needs, Physical needs  Caregiver Assistance: Caregiver assistance needed per identified barriers - however, level of patient's required assistance exceeds assistance available at home  Cognition Needs: 24hr supervision for safety awareness needed, Medication and/or medical management daily assist needed, Recollection or understanding of precautions/restrictions limited, Insight of patient limited regarding functional ability/needs, Cognition-related high falls risk  Physical Needs: Ambulating household distances limited by function/safety, Intermittent mobility assistance needed, Intermittent ADL assistance needed, High falls risk due to function or environment  Evaluation/Treatment Tolerance: Patient limited by fatigue (LIMITED BY (+) ORTHOS)  End of Session Communication: Bedside nurse (MOBILITY STATUS)  Assessment Comment: CGA TO STAND EOB NOTED (+) ORTHOS, EDUCAZTED PT AND SON ON IMPORTANCE OF CALLING FOR ASSIST TO AMB W/ (+) ORTHOS,  FALL RISK RECOMMEND LOW REHAB PENDING PROGRESS, RECOMMEND 24 HR ASSIST AT HOME FOR SEVERAL DAYS ON DISCH  End of Session Patient Position: Bed, 3 rail up, Alarm on (PT. SEATED EOB SOB PRESENT)  IP OR SWING BED PT PLAN  Inpatient or Swing Bed: Inpatient  PT Plan  Treatment/Interventions: Transfer training, Gait training, Strengthening, Endurance training  PT Plan: Ongoing PT  PT Frequency: 4 times per week  PT Discharge Recommendations: Low intensity level of continued care  Equipment " Recommended upon Discharge:  (TBD)  PT Recommended Transfer Status: Assist x1, Stand by assist  PT - OK to Discharge: Yes (WHEN MEDICALLY CLEARED)    Subjective   General Visit Information:  General  Reason for Referral: STROKE  Referred By: NATION CNP  Past Medical History Relevant to Rehab: AMS; DX: RECENT INFLUENZA A, THC, IN SYSTEM, HYPOKALEMIA, HYPOCALCEMIA, HYPERTENSIVE URGENCY; HX: HTN, SLAIVARY GLAND CA, TOBACCO, RLS  PT Missed Visit: Yes  Missed Visit Reason: Patient placed on medical hold (JUST PRIOR TO THERAPIST ENTERING ROOM WIFE CAME OUT OF ROOM STATING PT WAS STARTING AN EPISODE OF FACIAL TINGLING AND CONFUSION, HOLDING THERAPY AT THIS TIME,WILL INITIATE EVAL WHEN MEDICALLY APPROPRIATE)  Family/Caregiver Present: Yes  Caregiver Feedback: SON  Co-Treatment: OT  Co-Treatment Reason: FACILITATE MOBILITY SAFETY  Prior to Session Communication: Bedside nurse (OK FOR THERAPY)  Patient Position Received: Bed, 3 rail up, Alarm on (ROOM 2313 ALERT IV; AGREES TO THERAPY)  Home Living:  Home Living  Home Living Comments: LIVES W/ SPOUSE IN 1 LEVEL HOME, AMB/ADL INDEP, DOES CHORES,  WORKS IN FACTORY, DRIVES  Prior Level of Function:     Precautions:  Precautions  Hearing/Visual Limitations: [place]  Medical Precautions: Fall precautions     Vital Signs (Past 2hrs)        Date/Time Vitals Session Patient Position Pulse Resp SpO2 BP MAP (mmHg)    01/16/25 1351 --  --  64  17  94 %  186/100  129     01/16/25 1400 --  --  --  --  --  170/87  --     01/16/25 1401 --  --  --  --  --  136/89  --     01/16/25 1420 During OT  --  --  --  --  170/87  --                   Vital Signs Comment: TOOK BP TO CHECK FOR ORTHOSTASSI- SEATED EOB- 170/87; STANDING 136/89, NO C/O SYMPTOMS     Objective   Pain:  Pain Assessment  0-10 (Numeric) Pain Score: 0 - No pain  Cognition:  Cognition  Overall Cognitive Status: Within Functional Limits  Arousal/Alertness: Delayed responses to stimuli (FLAT AFFECT)  Orientation Level: Disoriented to  situation  Following Commands: Follows one step commands with increased time  Safety Judgment: Decreased awareness of need for safety precautions  Problem Solving: Assistance required to identify errors made  Attention: Exceptions to WFL  Sustained Attention: Impaired  Safety/Judgement: Exceptions to WFL  Complex Functional Tasks: Minimal  Novel Situations: Minimal  Routine Tasks: Minimal  Insight: Mild  Processing Speed: Delayed    General Assessments:                  Activity Tolerance  Endurance: Decreased tolerance for upright activites    Sensation  Light Touch: No apparent deficits    Strength  Strength Comments: ROM LEGS WFL, STRENGTH IN LEGS 3+/5 DECRESED MUSCULAR ENDURANCE  Strength  Strength Comments: ROM LEGS WFL, STRENGTH IN LEGS 3+/5 DECRESED MUSCULAR ENDURANCE                     Static Sitting Balance  Static Sitting-Comment/Number of Minutes: FAIR  Dynamic Sitting Balance  Dynamic Sitting-Comments: FAIR    Static Standing Balance  Static Standing-Comment/Number of Minutes: FAIR-  Dynamic Standing Balance  Dynamic Standing-Comments: FAIR-  Functional Assessments:  Bed Mobility  Bed Mobility:  (SUPINE>SIT CGA. HOB 30 DEGREES, SAT EOB SBA 8 MIN)    Transfers  Transfer:  (SIT<>STAND CGA 1-2 A, STOOD EOB TOTAL 4 MIN LEGS UNSTEADY, R LE >LLE, NO C/O LIGHTHEADEDNESS BUT TOOK BP (+) ORTHO NOTED, HAD PT. SIT BACK DOWN)  Extremity/Trunk Assessments:     Outcome Measures:  [facility] Basic Mobility  Turning from your back to your side while in a flat bed without using bedrails: None  Moving from lying on your back to sitting on the side of a flat bed without using bedrails: A little  Moving to and from bed to chair (including a wheelchair): A little  Standing up from a chair using your arms (e.g. wheelchair or bedside chair): A little  To walk in hospital room: A lot  Climbing 3-5 steps with railing: A lot  Basic Mobility - Total Score: 17    Encounter Problems       Encounter Problems (Active)       Mobility        STG - Patient will ambulate (Not Progressing)       Start:  01/16/25    Expected End:  01/24/25       FWW/CANE PRN FOR GAIT STABILITY, +FT,  (-) ORTHOS         Goal 1 (Not Progressing)       Start:  01/16/25    Expected End:  02/06/25       20 REPS RROM INCREASING STRENGTH & ASSISTED BALANCE ACTIVITIES TO STABILIZE GAIT            PT Transfers       STG - Patient will transfer sit to and from stand (Not Progressing)       Start:  01/16/25    Expected End:  01/23/25       FWW/CANE PRN FOR STABILITY USING PROPER TECHNIQUE SBA            Pain - Adult              Education Documentation  Mobility Training, taught by Lidia Healy, PT at 1/16/2025  3:37 PM.  Learner: Patient  Readiness: Acceptance  Method: Explanation  Response: Needs Reinforcement  Comment: MOBILITY SAFETY    Education Comments  No comments found.

## 2025-01-16 NOTE — CONSULTS
Nutrition Initial Assessment:   Nutrition Assessment    Reason for Assessment: Admission nursing screening    Medical history per chart: HTN, salivary gland CA about 8 years ago per 1/15/25 Neurology note    Admitted with hypocalcemia, hypokalemia, hypertensive urgency, altered mental status    1/16: RD working remotely, spoke with RN via Secure Chat and pt on room phone. Pt in isolation noted. RN stated pt without GI complaints today, pt confirmed, NVD noted on H&P. Pt reported appetite improved to good today, weight loss as below. Pt receptive to Ensure Plus High Protein BID, will send. Pt admission Dx, PMH, labs, medications, allergies, diet orders, skin integrity reviewed. RD to follow per POC, protocol.      Nutrition History:  Energy Intake: Fair 50-75 %  Food and Nutrient History: Pt reported fair appetite for past few months d/t stress and painful upper dentures.  Vitamin/Herbal Supplement Use: Pt reported Equate ONS and V8 once daily.     Current Diet: Adult diet Cardiac; 70 gm fat; 2 - 3 grams Sodium  Average meal Intake during admission:  75% x 1 meal, pt reported ate 100% of Breakfast today.      Nutrition Related Findings:   Oral Symptoms:  Has upper dentures, pt reported painful at times      GI symptoms: no GI issues at this time.   Food allergies: NKFA. is allergic to lisinopril, morphine, and valium [diazepam].  Meds/Labs reviewed.  aspirin, 81 mg, oral, Daily  atorvastatin, 40 mg, oral, Nightly  clopidogrel, 75 mg, oral, Daily  cyanocobalamin, 1,000 mcg, intramuscular, Daily  escitalopram, 20 mg, oral, Daily  [START ON 1/17/2025] metoprolol succinate XL, 25 mg, oral, Daily  pantoprazole, 40 mg, oral, Daily before breakfast   Or  pantoprazole, 40 mg, intravenous, Daily before breakfast  valsartan, 320 mg, oral, Daily         Nutrition Significant Labs:    Results from last 7 days   Lab Units 01/16/25  0534 01/15/25  1318 01/14/25  0525 01/13/25  0930   GLUCOSE mg/dL 121* 103* 104* 202*   SODIUM  "mmol/L 140 144 139 138   POTASSIUM mmol/L 4.0 2.9* 3.7 4.3   CHLORIDE mmol/L 106 119* 104 102   CO2 mmol/L 28 20* 29 28   BUN mg/dL 12 8 11 12   CREATININE mg/dL 0.57 0.48* 0.60 0.84   EGFR mL/min/1.73m*2 >90 >90 >90 >90   CALCIUM mg/dL 8.5* 5.5* 8.3* 8.9   MAGNESIUM mg/dL  --   --   --  1.83     Lab Results   Component Value Date    HGBA1C 5.9 (H) 01/14/2025    HGBA1C 6.2 (H) 11/01/2024     Results from last 7 days   Lab Units 01/15/25  2059 01/15/25  1244   POCT GLUCOSE mg/dL 131* 180*     Anthropometrics:  Height: 188 cm (6' 2\")   Weight: 86.2 kg (190 lb 0.6 oz)   BMI (Calculated): 24.39  IBW/kg (Dietitian Calculated): 86.4 kg        Weight History:   Wt Readings from Last 10 Encounters:   01/15/25 86.2 kg (190 lb 0.6 oz)   01/13/25 86.2 kg (190 lb)   12/06/24 90.7 kg (200 lb)   02/05/20 112 kg (247 lb 12.8 oz)      Weight Change %:  Weight History / % Weight Change: Weight loss indicated on MST. Pt reported UBW of 245 pounds, weight loss around 60 pounds x 6 months. Wt Hx does not go back that far in chart review, but when 90.7 kg (12/6/24) compared to 86.2 kg (1/15/25) 5% loss indicated.  Significant Weight Loss: Yes  Interpretation of Weight Loss: 5% in 1 month     Nutrition Focused Physical Exam Findings:  defer: RD working remotely, pt in isolation precautions  Subcutaneous Fat Loss:      Muscle Wasting:     Edema:  Edema: none (per Flowsheet)  Physical Findings:  Skin: Negative (per Flowsheet)    Estimated Needs:   Total Energy Estimated Needs (kCal):  (3690-0097)  Method for Estimating Needs: based on IBW  Total Protein Estimated Needs (g):  (70-90)  Method for Estimating Needs: based on IBW  Total Fluid Estimated Needs (mL):   Method for Estimating Needs: 1 mL/kcal      Nutrition Diagnosis   Nutrition Diagnosis:  Malnutrition Diagnosis  Patient has Malnutrition Diagnosis:  (Suspect but unable to determine at this time.)    Nutrition Diagnosis  Patient has Nutrition Diagnosis: Yes  Diagnosis Status (1): " New  Nutrition Diagnosis 1: Predicted inadequate energy intake  Related to (1): Mental status, dentures  As Evidenced by (1): Pt reported stress and denture pain causing decreased PO intake over past few months, reported weight loss x 6 months, documented 5% weight loss x 1 month, admitted with altered mental status  Additional Assessment Information (1): Noted muscle wasting per [facility] previous admission (1/14/25)     Nutrition Interventions/Recommendations   Nutrition Interventions and Recommendations:        Nutrition Prescription:  Individualized Nutrition Prescription Provided for : Diet, will send Ensure Plus High Protein ONS BID.        Nutrition Interventions:   Food and/or Nutrient Delivery Interventions  Interventions: Meals and snacks, Medical food supplement  Meals and Snacks: Fat-modified diet, Mineral-modified diet  Goal: consume >75%  Medical Food Supplement: Commercial beverage  Goal: consume ONS BID    Coordination of Nutrition Care by a Nutrition Professional  Collaboration and Referral of Nutrition Care: Collaboration by nutrition professional with other providers  Goal: Discussed with TALI Townsend    Nutrition Education:   Education Documentation  No documentation found.    Nutrition Counseling  Counseling Theoretical Approach: Nutrition counseling based on health belief model  Goal: Discussed diet, ONS     Nutrition Monitoring and Evaluation   Monitoring/Evaluation:   Food/Nutrient Related History Monitoring  Monitoring and Evaluation Plan: Amount of food  Amount of Food: Estimated amout of food  Criteria: consume >75% of meals    Body Composition/Growth/Weight History  Monitoring and Evaluation Plan: Weight change  Weight Change: Weight change percentage  Criteria: Maintain stable weight    Biochemical Data, Medical Tests and Procedures  Monitoring and Evaluation Plan: Electrolyte/renal panel, Glucose/endocrine profile  Electrolyte and Renal Panel: BUN, Creatinine, Magnesium, Phosphorus,  Potassium, Sodium  Criteria: WDL  Glucose/Endocrine Profile: Glucose, casual  Criteria: WDL    Nutrition Focused Physical Findings  Monitoring and Evaluation Plan: Adipose, Muscles, Skin  Adipose: Other (Comment) (monitor for loss)  Criteria: WDL  Muscles: Other (Comment) (monitor for loss)  Criteria: WDL  Skin: Other (Comment) (monitor for breakdown)  Criteria: intact        Time Spent/Follow-up Reminder:   Follow Up  Time Spent (min): 45 minutes  Last Date of Nutrition Visit: 01/16/25  Nutrition Follow-Up Needed?: Dietitian to reassess per policy  Follow up Comment: 1/19-1/21

## 2025-01-16 NOTE — PROGRESS NOTES
"Occupational Therapy  Evaluation    Patient Name: Cheng De Jesus \"Luther\"  MRN: [id number]  Today's Date: 1/16/2025  Time Calculation  Start Time: 1420  Stop Time: 1438  Time Calculation (min): 18 min    Current Problem:   1. Altered mental status, unspecified altered mental status type    2. Hypokalemia    3. Hypocalcemia    4. Hypertensive urgency    5. TIA (transient ischemic attack)        OT order: OT eval and treat   Referred by: Dez  Reason for referral: ADLs, safety assessment  Past medical history related to rehab:  has a past medical history of Cancer (Multi) and Hypertension.    He has no past medical history of CHF (congestive heart failure), Diabetes mellitus (Multi), or Renal insufficiency.     Precautions:   Hearing/Visual Limitations: intact  Medical Precautions: Fall precautions    ASSESSMENT  OT Assessment: OT eval completed. The patient is functioning below baseline for ADLs and mobility. can benefit from continued OT. Pt with Decreased ADL status, Decreased upper extremity strength, Decreased safe judgment during ADL, Decreased cognition, Decreased endurance, Decreased IADLs  Prognosis:    Barriers to discharge home: Cognition needs, Physical needs     Intermittent mobility assistance needed, Intermittent ADL assistance needed  24hr supervision for safety awareness needed  Tolerance:      PLAN  Frequency: 2 times per week  Treatment Interventions: ADL retraining, Functional transfer training, UE strengthening/ROM, Endurance training, Cognitive reorientation, Patient/family training, Equipment evaluation/education, Neuromuscular reeducation  Discharge Recommendations: Low intensity level of continued care  OT OK to discharge: Yes    GENERAL VISIT INFORMATION   Start of session communication: Bedside nurse  End of session communication: Bedside nurse  Family/caregiver present: Yes  Caregiver feedback: son present  Co-Treatment: PT  Reason for co-treatment: to optimize safety and mobility, while focusing " on discipline specific goals   Position Pt Received:  Bed, 3 rail up, Alarm on  End of session position: Bed, 3 rail up, Alarm on    SUBJECTIVE  Home Living:  Type of Home: House  Lives With: Spouse  Home Adaptive Equipment: None  Home Layout:  (no concerns)  Home Access: No concerns     Prior Level of Function:  Receives Help From: Family  ADL Assistance: Independent  Homemaking Assistance: Independent  Ambulatory Assistance: Independent  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 dropped to 136/89 standing.)    Cognition:  Overall Cognitive Status: Within Functional Limits (except flat affect, decreased processing)  Orientation Level:  (oriented to person and place and time.)             Current ADL function:   EATING:  Stand by     GROOMING: Minimal     BATHING: Moderate     UB DRESSING: Minimal     LB DRESSING: Stand by     TOILETING: Stand by    ADL comments:       Activity Tolerance:  Endurance: Decreased tolerance for upright activites  Activity Tolerance Comments: flucuating BPs. feeling off. lightheaded.    Bed Mobility/Transfers:   Bed Mobility  Bed Mobility: Yes  Bed Mobility 1  Bed Mobility 1: Supine to sitting, Sitting to supine  Level of Assistance 1: Close supervision  Transfers  Transfer: Yes  Transfer 1  Transfer From 1: Sit to  Transfer to 1: Stand  Technique 1: Stand to sit  Transfer Level of Assistance 1: Contact guard    Ambulation/Gait Training:  Functional Mobility  Functional Mobility Performed: No    Sitting Balance:  Static Sitting Balance  Static Sitting-Level of Assistance: Close supervision  Dynamic Sitting Balance  Dynamic Sitting-Level of Assistance: Close supervision    Standing Balance:  Static Standing Balance  Static  Standing-Level of Assistance: Contact guard  Dynamic Standing Balance  Dynamic Standing-Level of Assistance: Contact guard    Vision: Vision - Basic Assessment  Current Vision: Wears glasses all the time   and      Sensation:  Light Touch: No apparent deficits (denies facial numbness at this time)    Strength:  Strength Comments: BUE grossly 4-/5 to 4/5    Perception:  Inattention/Neglect: Appears intact    Coordination:  Movements are Fluid and Coordinated: Yes     Hand Function:  Hand Function  Gross Grasp: Functional  Coordination: Functional    Extremities: RUE   RUE : Within Functional Limits and LUE   LUE: Within Functional Limits    Outcome Measures: [facility] Daily Activity   Putting on and taking off regular lower body clothing: A little  Bathing (including washing, rinsing, drying): A lot  Putting on and taking off regular upper body clothing: A little  Toileting, which includes using toilet, bedpan or urinal: A little  Taking care of personal grooming such as brushing teeth: A little   Eating Meals: A little   Daily Activity - Total Score: 17    EDUCATION:     Education Documentation  ADL Training, taught by Lidya Lowry OT at 1/16/2025  3:26 PM.  Learner: Patient  Readiness: Acceptance  Method: Explanation  Response: Needs Reinforcement    Education Comments  No comments found.        Goals:   Encounter Problems       Encounter Problems (Active)       EXERCISE/STRENGTHENING       Patient will progress orthostatic stress tolerance in standing to 10 minutes or more without adverse symptoms or change in VS (Progressing)       Start:  01/16/25    Expected End:  01/30/25               MOBILITY       Patient will perform Functional mobility max Household distances/Community Distances with modified independent level of assistance and least restrictive device in order to improve safety and functional mobility. (Progressing)       Start:  01/16/25    Expected End:  01/30/25

## 2025-01-16 NOTE — PROGRESS NOTES
"Cheng De Jesus \"Elle" is a 64 y.o. male admitted for Altered mental status, unspecified. Pharmacy reviewed the patient's [id number] medications and allergies for accuracy.    The list below reflects the PTA list prior to pharmacy medication history. A summary a changes to the PTA medication list has been listed below. Please review each medication in order reconciliation for additional clarification and justification.    Source of information:  Discharge paperwork 01/14/25  No changes!  Medications added:    Medications modified:    Medications to be removed:    Medications of concern:      Prior to Admission Medications   Prescriptions Last Dose Informant Patient Reported? Taking?   escitalopram (Lexapro) 20 mg tablet   Yes No   Sig: Take 1 tablet (20 mg) by mouth once daily.   metoprolol succinate XL (Toprol-XL) 50 mg 24 hr tablet   Yes No   Sig: Take 1 tablet (50 mg) by mouth once daily.   valsartan (Diovan) 320 mg tablet   Yes No   Sig: Take 1 tablet (320 mg) by mouth once daily.      Facility-Administered Medications: None       Yolanda Lambert"

## 2025-01-16 NOTE — CARE PLAN
Problem: Mobility  Goal: STG - Patient will ambulate  Description: FWW/CANE PRN FOR GAIT STABILITY, +FT,  (-) ORTHOS  Outcome: Not Progressing  Goal: Goal 1  Description: 20 REPS RROM INCREASING STRENGTH & ASSISTED BALANCE ACTIVITIES TO STABILIZE GAIT  Outcome: Not Progressing     Problem: PT Transfers  Goal: STG - Patient will transfer sit to and from stand  Description: FWW/CANE PRN FOR STABILITY USING PROPER TECHNIQUE SBA  Outcome: Not Progressing

## 2025-01-16 NOTE — PROGRESS NOTES
"Cheng De Jesus \"Luther\" is a 64 y.o. male on day 0 of admission presenting with Altered mental status, unspecified.    Subjective   Cheng De Jesus \"Luther\" is a 64 y.o. male with PMHx s/f HTN presenting with altered mental status.  He was discharged 2 days ago for transient altered mental status and hypertensive emergency. His wife states he was fine on the day of discharge and this morning. The episode is similar to how he present to the hospital 2 days ago.  He did his normal morning routine but started having dizziness like the room is spinning and near syncope.  His wife reports that his anxiety has been high since his mom has been hospitalized. He became really confused and repeating that he has to see his mom at the hospital.  His mom was discharged yesterday. He referred that bus (ambulance) took him to the hospital to see his mom and kept repeating \"she's upstairs in room 2022, I have to go see her now\". He was reportedly agitated and became verbally violent on arrival to ED. His wife notes he took his blood pressure medications this morning. He had 2 episodes of nausea and NBNB emesis and diarrhea. He was diagnosed with Influenza A 2 days ago. Denies f/c, syncope, lateralizing or focal weakness, chest pain, shortness of breath, abdominal pain.     ED Course (Summary - please note all labs, imaging studies, and interventions noted below have been personally reviewed and/or interpreted on day of admission):   Vitals on presentation: 97.7, 56 bpm, 18 RR, 207/110, 100% on RA  Labs: CMP-glucose 103, potassium 2.9, calcium 5.5, BUN 2.4  CBC largely unremarkable  Cannabinoid positive  EKG: Sinus rhythm at 59 bpm, no acute ischemic changes  Imaging: CT head-no acute intracranial pathologic findings  CXR-no acute chest finding  Interventions: Ativan 1 mg, Geodon 10 mg, admission for further management     12-point ROS reviewed and found to be negative aside from aforementioned positives in HPI and/or noted in dedicated ROS " "section below.     1/16: Per his nurse and wife his mentation was initially improved upon waking but as the morning went on he became more confused. He responded well to benzos yesterday so I tried them again today but they didn't help. He eventually required IM Zypreza.          Objective     Constitutional: Pleasant and cooperative. Laying in bed in no acute distress. Conversant.   Skin: Warm and dry; no obvious lesions, rashes, pallor, or jaundice.   Eyes: EOMI. Anicteric sclera.   ENT: Mucous membranes moist; no obvious injury or deformity appreciated.   Head and Neck: Normocephalic, atraumatic. ROM preserved. Trachea midline. No appreciable JVD.   Respiratory: Nonlabored on RA. Lungs clear to auscultation bilaterally without obvious adventitious sounds. Chest rise is equal.  Cardiovascular: RRR. No gross murmur, gallop, or rub. Extremities are warm and well-perfused with good capillary refill (< 3 seconds). No chest wall tenderness.   GI: Abdomen soft, nontender, nondistended. No obvious organomegaly appreciated. Bowel sounds are present.  : No CVA tenderness.   MSK: No gross abnormalities appreciated. No limitations to AROM/PROM appreciated.   Extremities: No cyanosis, edema, or clubbing evident. Neurovascularly intact.   Neuro: A&Ox1,  No facial asymmetry, no obvious dysarthria or aphasia noted.  Sensory intact.  No gaze palsy.  Strength 5 out of 5 in bilateral upper and lower extremities.  No motor drift.  Psych: Appropriate mood and behavior.    Last Recorded Vitals  Blood pressure (!) 178/94, pulse 57, temperature 36.7 °C (98.1 °F), temperature source Temporal, resp. rate 18, height 1.88 m (6' 2\"), weight 86.2 kg (190 lb 0.6 oz), SpO2 96%.  Intake/Output last 3 Shifts:  No intake/output data recorded.    Relevant Results                              Assessment/Plan   Altered mental status (?Agitated delirium/catatonia)  -influenza A positive, ? metabolic encephalopathy possibly exacerbated by B12 " deficiency  -CTH negative  -MRI: no acute changes; mild scattered FLAIR hyperintense foci in subcortical and deep white matter of bilateral frontal lobes, which may represent microangiopathic disease versus sequela of migraines. MRA without acute findings.   -Trial at bedtime Seroquel   -per neuro:  Start Plavix 75 mg a day and aspirin 81 mg a day for 21 days.  After 21 days, the Plavix can be discontinued and aspirin can be continued.   -neurology consult    B12 Deficiency   -Repleting     Hypokalemia, Hypocalcemia  -replacements ordered, replenish as necessary     HTN  -Initially hypertensive at 207/110 >> /92 at admit  -continue Toprol-XL (dose decreased 2/2 bradycardia), valsartan      Kenneth Strickland MD PhD

## 2025-01-16 NOTE — PROGRESS NOTES
"Physical Therapy                 Therapy Communication Note    Patient Name: Cheng De Jesus \"Luther\"  MRN: [id number]  Department: [facility]  Room: [address] [place], [place] [address]  Today's Date: 1/16/2025     Discipline: Physical Therapy    PT Missed Visit: Yes     Missed Visit Reason: Missed Visit Reason: Patient placed on medical hold (JUST PRIOR TO THERAPIST ENTERING ROOM WIFE CAME OUT OF ROOM STATING PT WAS STARTING AN EPISODE OF FACIAL TINGLING AND CONFUSION, HOLDING THERAPY AT THIS TIME,WILL INITIATE EVAL WHEN MEDICALLY APPROPRIATE)    Missed Time: Attempt    Comment:  "

## 2025-01-16 NOTE — CARE PLAN
The patient's goals for the shift include  rest    The clinical goals for the shift include patient will be free of fall and injury this shift    Problem: Nutrition  Goal: Less than 5 days NPO/clear liquids  Outcome: Progressing  Goal: Oral intake greater than 50%  Outcome: Progressing  Goal: Oral intake greater 75%  Outcome: Progressing  Goal: Consume prescribed supplement  Outcome: Progressing  Goal: Adequate PO fluid intake  Outcome: Progressing  Goal: Nutrition support goals are met within 48 hrs  Outcome: Progressing  Goal: Nutrition support is meeting 75% of nutrient needs  Outcome: Progressing  Goal: Tube feed tolerance  Outcome: Progressing  Goal: BG  mg/dL  Outcome: Progressing  Goal: Lab values WNL  Outcome: Progressing  Goal: Electrolytes WNL  Outcome: Progressing  Goal: Promote healing  Outcome: Progressing  Goal: Maintain stable weight  Outcome: Progressing  Goal: Reduce weight from edema/fluid  Outcome: Progressing  Goal: Gradual weight gain  Outcome: Progressing  Goal: Improve ostomy output  Outcome: Progressing     Problem: Pain - Adult  Goal: Verbalizes/displays adequate comfort level or baseline comfort level  Outcome: Progressing     Problem: Safety - Adult  Goal: Free from fall injury  Outcome: Progressing     Problem: Discharge Planning  Goal: Discharge to home or other facility with appropriate resources  Outcome: Progressing     Problem: Chronic Conditions and Co-morbidities  Goal: Patient's chronic conditions and co-morbidity symptoms are monitored and maintained or improved  Outcome: Progressing

## 2025-01-16 NOTE — PROGRESS NOTES
01/16/25 1123   Discharge Planning   Living Arrangements Spouse/significant other   Support Systems Spouse/significant other   Assistance Needed Independent   Type of Residence Private residence   Home or Post Acute Services None   Expected Discharge Disposition Home   Does the patient need discharge transport arranged? No   Financial Resource Strain   How hard is it for you to pay for the very basics like food, housing, medical care, and heating? Not hard   Housing Stability   In the last 12 months, was there a time when you were not able to pay the mortgage or rent on time? N   At any time in the past 12 months, were you homeless or living in a shelter (including now)? N   Transportation Needs   In the past 12 months, has lack of transportation kept you from medical appointments or from getting medications? no   In the past 12 months, has lack of transportation kept you from meetings, work, or from getting things needed for daily living? No   Stroke Family Assessment   Stroke Family Assessment Needed Yes   Primary Caregiver/Family After Discharge Spouse/Significant Other   Physical Layout of Home One Story   Caregiver/Family can provide assistance with ADL's Yes   Caregiver/Family can provide mobility assistance for transfers in and out of bed, chair or car Yes   Medications: Caregiver/Family can obtain prescriptions Yes   Medications: Caregiver/Family can assist with medication administration Yes   Medications: Caregiver/Family verbalizes understanding of medications Yes   Caregiver/Family agrees with discharge plan to home Yes   Caregiver/Family verbalizes capability of caring for patient at home Yes   Intensity of Service   Intensity of Service 0-30 min     PCP is Glory Royal MD. Patient is from home with his wife. Spoke to patient and his wife at bedside. Patient is independent with ambulation, self care, driving, shopping, and meals. Patient prefers to return home with no needs upon discharge. [facility] will  continue to follow for needs if they arise.

## 2025-01-17 LAB
GLUCOSE BLD MANUAL STRIP-MCNC: 125 MG/DL (ref 74–99)
GLUCOSE BLD MANUAL STRIP-MCNC: 149 MG/DL (ref 74–99)
GLUCOSE BLD MANUAL STRIP-MCNC: 168 MG/DL (ref 74–99)

## 2025-01-17 PROCEDURE — 2500000002 HC RX 250 W HCPCS SELF ADMINISTERED DRUGS (ALT 637 FOR MEDICARE OP, ALT 636 FOR OP/ED): Performed by: INTERNAL MEDICINE

## 2025-01-17 PROCEDURE — 99233 SBSQ HOSP IP/OBS HIGH 50: CPT | Performed by: INTERNAL MEDICINE

## 2025-01-17 PROCEDURE — 2500000001 HC RX 250 WO HCPCS SELF ADMINISTERED DRUGS (ALT 637 FOR MEDICARE OP)

## 2025-01-17 PROCEDURE — 99233 SBSQ HOSP IP/OBS HIGH 50: CPT | Performed by: NURSE PRACTITIONER

## 2025-01-17 PROCEDURE — 1200000002 HC GENERAL ROOM WITH TELEMETRY DAILY

## 2025-01-17 PROCEDURE — 2500000004 HC RX 250 GENERAL PHARMACY W/ HCPCS (ALT 636 FOR OP/ED): Performed by: NURSE PRACTITIONER

## 2025-01-17 PROCEDURE — 2500000001 HC RX 250 WO HCPCS SELF ADMINISTERED DRUGS (ALT 637 FOR MEDICARE OP): Performed by: NURSE PRACTITIONER

## 2025-01-17 PROCEDURE — 82947 ASSAY GLUCOSE BLOOD QUANT: CPT

## 2025-01-17 PROCEDURE — 2500000004 HC RX 250 GENERAL PHARMACY W/ HCPCS (ALT 636 FOR OP/ED): Performed by: INTERNAL MEDICINE

## 2025-01-17 PROCEDURE — 2500000001 HC RX 250 WO HCPCS SELF ADMINISTERED DRUGS (ALT 637 FOR MEDICARE OP): Performed by: INTERNAL MEDICINE

## 2025-01-17 RX ORDER — CYANOCOBALAMIN 1000 UG/ML
1000 INJECTION, SOLUTION INTRAMUSCULAR; SUBCUTANEOUS DAILY
Status: DISCONTINUED | OUTPATIENT
Start: 2025-01-18 | End: 2025-01-19 | Stop reason: HOSPADM

## 2025-01-17 RX ORDER — QUETIAPINE FUMARATE 25 MG/1
25 TABLET, FILM COATED ORAL ONCE
Status: COMPLETED | OUTPATIENT
Start: 2025-01-17 | End: 2025-01-17

## 2025-01-17 RX ORDER — CYANOCOBALAMIN 1000 UG/ML
1000 INJECTION, SOLUTION INTRAMUSCULAR; SUBCUTANEOUS DAILY
Status: DISCONTINUED | OUTPATIENT
Start: 2025-01-17 | End: 2025-01-17

## 2025-01-17 RX ORDER — AMLODIPINE BESYLATE 5 MG/1
5 TABLET ORAL DAILY
Status: DISCONTINUED | OUTPATIENT
Start: 2025-01-17 | End: 2025-01-18

## 2025-01-17 RX ADMIN — ATORVASTATIN CALCIUM 40 MG: 40 TABLET, FILM COATED ORAL at 20:47

## 2025-01-17 RX ADMIN — CLOPIDOGREL 75 MG: 75 TABLET ORAL at 08:39

## 2025-01-17 RX ADMIN — ESCITALOPRAM OXALATE 20 MG: 10 TABLET ORAL at 08:38

## 2025-01-17 RX ADMIN — ENOXAPARIN SODIUM 40 MG: 40 INJECTION SUBCUTANEOUS at 15:06

## 2025-01-17 RX ADMIN — CYANOCOBALAMIN 1000 MCG: 1000 INJECTION, SOLUTION INTRAMUSCULAR; SUBCUTANEOUS at 08:39

## 2025-01-17 RX ADMIN — AMLODIPINE BESYLATE 5 MG: 5 TABLET ORAL at 12:58

## 2025-01-17 RX ADMIN — VALSARTAN 320 MG: 80 TABLET, FILM COATED ORAL at 06:16

## 2025-01-17 RX ADMIN — QUETIAPINE FUMARATE 25 MG: 25 TABLET ORAL at 12:58

## 2025-01-17 RX ADMIN — QUETIAPINE FUMARATE 50 MG: 25 TABLET ORAL at 17:34

## 2025-01-17 RX ADMIN — ASPIRIN 81 MG: 81 TABLET, COATED ORAL at 08:39

## 2025-01-17 ASSESSMENT — PAIN - FUNCTIONAL ASSESSMENT
PAIN_FUNCTIONAL_ASSESSMENT: 0-10
PAIN_FUNCTIONAL_ASSESSMENT: 0-10

## 2025-01-17 ASSESSMENT — COGNITIVE AND FUNCTIONAL STATUS - GENERAL
TOILETING: A LITTLE
MOBILITY SCORE: 23
DAILY ACTIVITIY SCORE: 23
CLIMB 3 TO 5 STEPS WITH RAILING: A LITTLE

## 2025-01-17 ASSESSMENT — PAIN SCALES - GENERAL
PAINLEVEL_OUTOF10: 0 - NO PAIN
PAINLEVEL_OUTOF10: 0 - NO PAIN

## 2025-01-17 NOTE — NURSING NOTE
RN was notified of patient's /92. Dr Bobo aware of results. RN is instructed to give valsartan early.    When talking to patient about high BP and how his night was overall, patient states that he slept very well and felt tired. When asked if he remembered declining seroquel earlier in the evening, patient reports that he did not recall the interaction at all.     Will pass along to day team.

## 2025-01-17 NOTE — PROGRESS NOTES
"[facility] Progress Note    Luther De Jesus is a 64 y.o. male on day 1 of admission presenting with Altered mental status, unspecified.  Subjective   Patient seen briefly at bedside and examined this afternoon. Wife at bedside. Patient resting in bed, lights off and blinds drawn. Patient was startled upon waking at bedside. Denies remembering who we are when asked. He is A&O x 4 but was definitely fairly slow to answer. Wife states that he is coming back to baseline and settling down but is a bit \"out of it\" after waking up.    Discussed with RN. States that he was apparently lucid enough to refuse the Seroquel last night (today states that he does not recall refusing). RN states early AM his mentation has been normal. However, about 15 minutes after his wife returned at bedside, he experienced another delirious episode almost exactly similar to prior episodes with confusion followed by facial numbness.        Objective     Vitals:    01/17/25 0553 01/17/25 0852 01/17/25 1231 01/17/25 1322   BP: (!) 191/92 169/84 (!) 175/92 (!) 163/105   BP Location: Left arm Left arm  Left arm   Patient Position: Lying Lying  Sitting   Pulse: 54 54 54 79   Resp: 20 18 20 18   Temp: 36.6 °C (97.8 °F) 36.5 °C (97.7 °F)  36.6 °C (97.8 °F)   TempSrc: Temporal Temporal  Temporal   SpO2: 97% 97% 98% 97%   Weight:       Height:             Physical Exam  Vitals reviewed.   Constitutional:       General: He is not in acute distress.     Appearance: Normal appearance.   Neurological:      Mental Status: He is alert.      Cranial Nerves: No dysarthria.       Neurological Exam  Mental Status  Alert. Oriented to person, place, time and situation. no dysarthria present. Language is fluent with no aphasia.  Resting in bed but awoken to participate in discussion and exam.    Cranial Nerves  CN II-XII grossly intact..    Motor  Normal muscle bulk throughout. No fasciculations present. Normal muscle tone. No abnormal involuntary " movements.  Strength normal at least to antigravity.    Sensory  Light touch is normal in upper and lower extremities.     Gait    Did not assess gait at this time.      Scheduled medications  amLODIPine, 5 mg, oral, Daily  aspirin, 81 mg, oral, Daily  atorvastatin, 40 mg, oral, Nightly  clopidogrel, 75 mg, oral, Daily  cyanocobalamin, 1,000 mcg, intramuscular, Daily  enoxaparin, 40 mg, subcutaneous, q24h  escitalopram, 20 mg, oral, Daily  metoprolol succinate XL, 25 mg, oral, Daily  pantoprazole, 40 mg, oral, Daily before breakfast   Or  pantoprazole, 40 mg, intravenous, Daily before breakfast  QUEtiapine, 50 mg, oral, Nightly  valsartan, 320 mg, oral, Daily      Continuous medications     PRN medications  PRN medications: acetaminophen, melatonin, ondansetron, oxygen     Lab Results   Component Value Date    WBC 3.7 (L) 01/16/2025    RBC 4.46 (L) 01/16/2025    HGB 13.6 01/16/2025    HCT 40.4 (L) 01/16/2025     (L) 01/16/2025     01/16/2025    K 4.0 01/16/2025     01/16/2025    BUN 12 01/16/2025    CREATININE 0.57 01/16/2025    EGFR >90 01/16/2025    CALCIUM 8.5 (L) 01/16/2025    ALKPHOS 39 01/16/2025    AST 17 01/16/2025    ALT 18 01/16/2025    MG 1.83 01/13/2025    [id number] 155 (L) 01/15/2025    HGBA1C 5.9 (H) 01/14/2025    LDLCALC 39 01/15/2025    CHOL 83 01/15/2025    HDL 31.3 01/15/2025    TRIG 65 01/15/2025    TSH 0.71 01/15/2025    TSH 2.27 11/01/2024       Below CT and MRI images and reports have been personally reviewed in PACS, agree with interpretations.    MR brain wo IV contrast 01/16/2025    Narrative  Interpreted By:  Shaggy Taylor,  STUDY:  MR BRAIN WO IV CONTRAST;  1/16/2025 6:54 pm    INDICATION:  Signs/Symptoms:? acute stroke on repeat HCT, fluctuating mentation.      COMPARISON:  CT head today. MRA yesterday. MR brain 01/13/2025.    ACCESSION NUMBER(S):  [id number]    ORDERING CLINICIAN:  EUGENE BURCIAGA    TECHNIQUE:  Axial T2, FLAIR, DWI, gradient echo T2 and sagittal  and coronal T1  weighted images of brain were acquired.    FINDINGS:  No abnormally restricted diffusion.     No acute intracranial  hemorrhage.     No extra-axial fluid collection. No intracranial  mass. Major vascular flow voids intact.    Mild white matter FLAIR signal increase most commonly seen with early  chronic small vessel ischemic change. The ventricles are normal in  size and configuration.  Brain parenchymal volume is age-appropriate.    Mild paranasal sinus/ethmoid mucosal inflammatory changes. No  significant nonspecific mastoid fluid. Orbital contents unremarkable.  No destructive osseous lesions identified.    Impression  1. No evidence of acute infarct, intracranial mass effect or midline  shift.  2. Mild white matter FLAIR signal increase most commonly seen with  early chronic small vessel ischemic change.    MACRO:  None    Signed by: Shaggy Taylor 1/16/2025 7:42 PM  Dictation workstation:   [id number]      EEG 01/16/2025      CT head wo IV contrast 01/16/2025    Narrative  Interpreted By:  Haris Dodge,  STUDY:  CT HEAD WO IV CONTRAST;  1/16/2025 12:18 pm    INDICATION:  Signs/Symptoms:PW encephalopathy. Resolved. Now recuring without  explanation.      COMPARISON:  MRI brain 01/13/2025, CT head without contrast 01/15/2025    ACCESSION NUMBER(S):  [id number]    ORDERING CLINICIAN:  MAGGIE BOWLES    TECHNIQUE:  Noncontrast axial CT images of head were obtained with coronal and  sagittal reconstructed images.    FINDINGS:  BRAIN PARENCHYMA: There is a subcentimeter hypodensity within the  posterior limb of the right internal capsule not seen on prior recent  head CT or MRI. No acute intraparenchymal hemorrhage or parenchymal  evidence of acute large territory ischemic infarct. Gray-white matter  distinction is preserved. No mass-effect.    VENTRICLES and EXTRA-AXIAL SPACES:  No acute extra-axial or  intraventricular hemorrhage. No effacement of cerebral sulci. The  ventricles and sulci are  age-concordant.    PARANASAL SINUSES/MASTOIDS:  No hemorrhage or air-fluid levels within  the visualized paranasal sinuses. The mastoids are well aerated.    CALVARIUM/ORBITS:  No skull fracture.  The orbits and globes are  intact to the extent visualized.    EXTRACRANIAL SOFT TISSUES: No discernible abnormality.    Impression  Compared to CT head 01/15/2025 and MRI brain 01/13/2025, the new  subcentimeter hypodensity within the posterior limb of right internal  capsule the raising the possibly of an acute lacunar infarct. This  would be expected to cause left-sided motor weakness and clinical  correlation is recommended.    No new acute intracranial abnormality otherwise.    MACRO:  None.    Signed by: Haris Dodge 1/16/2025 12:43 PM  Dictation workstation:   [id number]      NIH Stroke Scale  1A. Level of Consciousness: Alert, Keenly Responsive  1B. Ask Month and Age: Both Questions Right  1C. Blink Eyes & Squeeze Hands: Performs Both Tasks  2. Best Gaze: Normal  3. Visual: No Visual Loss  4. Facial Palsy: Normal Symmetrical Movements  5A. Motor - Left Arm: No Drift  5B. Motor - Right Arm: No Drift  6A. Motor - Left Leg: No Drift  6B. Motor - Right Leg: No Drift  7. Limb Ataxia: Absent  8. Sensory Loss: Normal  9. Best Language: No Aphasia  10. Dysarthria: Normal  11. Extinction and Inattention: No Abnormality  NIH Stroke Scale: 0          Fred Coma Scale  Best Eye Response: Spontaneous  Best Verbal Response: Confused  Best Motor Response:  (not cooperative)  Fred Coma Scale Score: 15        Assessment/Plan      Assessment & Plan  Altered mental status, unspecified    Altered mental status, unspecified altered mental status type      IMPRESSION:  Cheng De Jesus is a 64 y.o. male  with history of HTN presented for confusion, HTN and R hand/face tingling. Pt was initially admitted 1/13-1/14/25 for these symptoms. His mother was also admitted to the hospital with acute influenza A infection. He presented  back to the ED on 1/15/25 for more confusion with repeat tingling to RUE and face reported as well. BP on arrival was 207/110. Not on any antiplatelets or statins prior to arrival.  HCT 1/15/25 - no acute findings  MRI brain 1/13/25 - no acute findings, non-specific WM disease  MRA head/neck - no significant stenosis or LVO  Repeat HCT today 1/16/25 - new subcentimeter hypodensity in posterior limb of R internal capsule not present on HCT yesterday or MRI 3 days ago.   Repeat MRI brain wo con 1/16/25 - no acute findings, no change in MRI findings from 1/13/25  B12 low at 155  Lipid panel with LDL 39, CHOL 83 and TG 65  Folate and TSH wnl  A1C 5.9%  K 2.9 and Ca 5.5 on this admission  TTE 1/13/25 - normal EF and LA size, negative bubble study  EEG 1/16/25 - Normal. Noted several non-epileptic left leg movements occurring during      Acute encephalopathy  Likely multifactorial related to given recent exposure to influenza A, HTN / concurrent hypertensive episodes, and metabolic abnormalities. Delirium likely contributing factor.   From neurology standpoint, all imaging thus far, as mentioned above, does not suggest any intracranial abnormality including stroke versus mass verus seizure activity, etc.  Sensory alteration to R arm and face, resolved, treat as TIA  Influenza A positive  B12 deficiency  Repleting.   HTN urgency  Abnormal CT brain  New finding in R internal capsule is incidental - would not cause the episodes of sensory loss to arm and face on R side. Also not likely reason for his encephalopathy. Repeat MRI is unremarkable  Hypokalemia, improved  Hypocalcemia, improved     RECOMMENDATIONS:  Continue supportive care  Continue B12 supplementation  Delirium precautions  Continue 81 mg aspirin daily with food and Plavix x 3 weeks then stop Plavix and stay on aspirin.   Continue HI statin daily  Continue to monitor and manage vascular risk factors.  Continue blood pressure medications as indicated, no further  need to allow permissive HTN given normal repeat imaging and time frame since onset of symptoms.   Continue to optimize and improve any metabolic abnormalities and monitor for any further worsening signs of infection.   Recommend considering a psych eval for any possible correlation with these episodes that he is experiencing.     Discussed with wife and patient the new results from MRI brain and EEG.         I personally spent 55 minutes today, exclusive of procedures, providing care for this patient, including preparation, face to face time, documentation and other services such as review of medical records, diagnostic result, patient education, counseling, coordination of care as specified in the encounter.    RYLIE Marquez    I was present with the PA student who participated in the documentation of this note. I have personally seen and re-examined the patient and performed the medical decision-making components (assessment and plan of care). I have reviewed the PA student documentation and verified the findings in the note as written with additions or exceptions as stated in the body of this note.    Case discussed and managed with Dr. Corcoran  Will sign off from neurology. Please reach out if further input needed.   Wife expresses concern multiple times about pt returning to work and having forms filled out for such, directed her that ultimately will need to see PCP for continued care and clearance back to work due  to multifactorial metabolic/infectious issues.     Jovita Zapata, APRN-CNP

## 2025-01-17 NOTE — NURSING NOTE
At time of med pass, RN educated patient on new medication: seroquel. Lexidrug handout printed and discussed with patient. Patient states he was tired and did not want to take seroquel tonight due to episode of confusion earlier in the day. RN explained that the purpose of seroquel is to hopefully prevent episodes of confusion from happening. Patient verbalizes understanding. He states he would like to continue discussing it with the provider and his wife before starting something new. JCARLOS Villeda notified.

## 2025-01-17 NOTE — CARE PLAN
The patient's goals for the shift include no falls    The clinical goals for the shift include free of injuries    Over the shift, the patient did not make progress toward the following goals. Barriers to progression include . Recommendations to address these barriers include .

## 2025-01-17 NOTE — PROGRESS NOTES
"Physical Therapy                 Therapy Communication Note    Patient Name: Cheng De Jesus \"Luther\"  MRN: [id number]  Department: [facility] 2 E  Room: [address] [place], [place] [address]  Today's Date: 1/17/2025     Discipline: Physical Therapy    PT Missed Visit: Yes     Missed Visit Reason:  Other (Comment)    Missed Time: Attempt    Comment: RN request to allow pt to rest seeing as they were finally able to get him to \"settle down\"  "

## 2025-01-17 NOTE — PROGRESS NOTES
No likely weekend discharge, ADOD 72 hours due to metabolic encephalopathy. Patient plans to return home with no needs when medically ready.

## 2025-01-17 NOTE — PROGRESS NOTES
"Cheng De Jesus \"Luther\" is a 64 y.o. male on day 1 of admission presenting with Altered mental status, unspecified.    Subjective   Cheng De Jesus \"Luther\" is a 64 y.o. male with PMHx s/f HTN presenting with altered mental status.  He was discharged 2 days ago for transient altered mental status and hypertensive emergency. His wife states he was fine on the day of discharge and this morning. The episode is similar to how he present to the hospital 2 days ago.  He did his normal morning routine but started having dizziness like the room is spinning and near syncope.  His wife reports that his anxiety has been high since his mom has been hospitalized. He became really confused and repeating that he has to see his mom at the hospital.  His mom was discharged yesterday. He referred that bus (ambulance) took him to the hospital to see his mom and kept repeating \"she's upstairs in room 2022, I have to go see her now\". He was reportedly agitated and became verbally violent on arrival to ED. His wife notes he took his blood pressure medications this morning. He had 2 episodes of nausea and NBNB emesis and diarrhea. He was diagnosed with Influenza A 2 days ago. Denies f/c, syncope, lateralizing or focal weakness, chest pain, shortness of breath, abdominal pain.     ED Course (Summary - please note all labs, imaging studies, and interventions noted below have been personally reviewed and/or interpreted on day of admission):   Vitals on presentation: 97.7, 56 bpm, 18 RR, 207/110, 100% on RA  Labs: CMP-glucose 103, potassium 2.9, calcium 5.5, BUN 2.4  CBC largely unremarkable  Cannabinoid positive  EKG: Sinus rhythm at 59 bpm, no acute ischemic changes  Imaging: CT head-no acute intracranial pathologic findings  CXR-no acute chest finding  Interventions: Ativan 1 mg, Geodon 10 mg, admission for further management     12-point ROS reviewed and found to be negative aside from aforementioned positives in HPI and/or noted in dedicated ROS " "section below.     1/16: Per his nurse and wife his mentation was initially improved upon waking but as the morning went on he became more confused. He responded well to benzos yesterday so I tried them again today but they didn't help. He eventually required IM Zypreza.     1/17: Patient was apparently lucid enough to refuse Seroquel last night. This AM he was initially mentating normally (and had no recollection of refusing anything) but then as the day went on he again became delirious.            Objective     Constitutional: Pleasant and cooperative. Laying in bed in no acute distress. Conversant.   Skin: Warm and dry; no obvious lesions, rashes, pallor, or jaundice.   Eyes: EOMI. Anicteric sclera.   ENT: Mucous membranes moist; no obvious injury or deformity appreciated.   Head and Neck: Normocephalic, atraumatic. ROM preserved. Trachea midline. No appreciable JVD.   Respiratory: Nonlabored on RA. Lungs clear to auscultation bilaterally without obvious adventitious sounds. Chest rise is equal.  Cardiovascular: RRR. No gross murmur, gallop, or rub. Extremities are warm and well-perfused with good capillary refill (< 3 seconds). No chest wall tenderness.   GI: Abdomen soft, nontender, nondistended. No obvious organomegaly appreciated. Bowel sounds are present.  : No CVA tenderness.   MSK: No gross abnormalities appreciated. No limitations to AROM/PROM appreciated.   Extremities: No cyanosis, edema, or clubbing evident. Neurovascularly intact.   Neuro: A&Ox1,  No facial asymmetry, no obvious dysarthria or aphasia noted.  Sensory intact.  No gaze palsy.  Strength 5 out of 5 in bilateral upper and lower extremities.  No motor drift.    Last Recorded Vitals  Blood pressure (!) 163/105, pulse 79, temperature 36.6 °C (97.8 °F), temperature source Temporal, resp. rate 18, height 1.88 m (6' 2\"), weight 86.2 kg (190 lb 0.6 oz), SpO2 97%.  Intake/Output last 3 Shifts:  No intake/output data recorded.    Relevant " Results                              Assessment/Plan   Altered mental status (?Agitated delirium/catatonia)  -influenza A positive, ? metabolic encephalopathy possibly exacerbated by B12 deficiency  -CTH negative  -MRI: no acute changes; mild scattered FLAIR hyperintense foci in subcortical and deep white matter of bilateral frontal lobes, which may represent microangiopathic disease versus sequela of migraines. MRA without acute findings.   -Trial at bedtime Seroquel   -per neuro:  Start Plavix 75 mg a day and aspirin 81 mg a day for 21 days.  After 21 days, the Plavix can be discontinued and aspirin can be continued.   -neurology consult    B12 Deficiency   -Repleting     Hypokalemia, Hypocalcemia  -replacements ordered, replenish as necessary     HTN  -Initially hypertensive at 207/110 >> /92 at admit  -continue Toprol-XL (dose decreased 2/2 bradycardia), valsartan      Kenneth Strickland MD PhD

## 2025-01-17 NOTE — CARE PLAN
The patient's goals for the shift include  rest    The clinical goals for the shift include patient will be AOx4 throughout shift    Problem: Nutrition  Goal: Oral intake greater 75%  Outcome: Progressing  Goal: Consume prescribed supplement  Outcome: Progressing  Goal: Adequate PO fluid intake  Outcome: Progressing  Goal: BG  mg/dL  Outcome: Progressing  Goal: Lab values WNL  Outcome: Progressing  Goal: Electrolytes WNL  Outcome: Progressing  Goal: Maintain stable weight  Outcome: Progressing     Problem: Pain - Adult  Goal: Verbalizes/displays adequate comfort level or baseline comfort level  Outcome: Progressing     Problem: Safety - Adult  Goal: Free from fall injury  Outcome: Progressing     Problem: Discharge Planning  Goal: Discharge to home or other facility with appropriate resources  Outcome: Progressing     Problem: Chronic Conditions and Co-morbidities  Goal: Patient's chronic conditions and co-morbidity symptoms are monitored and maintained or improved  Outcome: Progressing

## 2025-01-17 NOTE — PROGRESS NOTES
"Occupational Therapy                 Therapy Communication Note    Patient Name: Cheng De Jesus \"Luther\"  MRN: [id number]  Department: [facility] 2 E  Room: [address] [place], [place] [address]  Today's Date: 1/17/2025     Discipline: Occupational Therapy          Missed Visit Reason:  Pt not seen this date as nrsg requested that pt be allowed to rest.     Missed Time: Attempt    Comment:  "

## 2025-01-18 LAB — GLUCOSE BLD MANUAL STRIP-MCNC: 203 MG/DL (ref 74–99)

## 2025-01-18 PROCEDURE — 2500000001 HC RX 250 WO HCPCS SELF ADMINISTERED DRUGS (ALT 637 FOR MEDICARE OP): Performed by: NURSE PRACTITIONER

## 2025-01-18 PROCEDURE — 1200000002 HC GENERAL ROOM WITH TELEMETRY DAILY

## 2025-01-18 PROCEDURE — 2500000001 HC RX 250 WO HCPCS SELF ADMINISTERED DRUGS (ALT 637 FOR MEDICARE OP): Performed by: INTERNAL MEDICINE

## 2025-01-18 PROCEDURE — 99233 SBSQ HOSP IP/OBS HIGH 50: CPT | Performed by: INTERNAL MEDICINE

## 2025-01-18 PROCEDURE — 82947 ASSAY GLUCOSE BLOOD QUANT: CPT

## 2025-01-18 PROCEDURE — 2500000001 HC RX 250 WO HCPCS SELF ADMINISTERED DRUGS (ALT 637 FOR MEDICARE OP)

## 2025-01-18 PROCEDURE — 2500000002 HC RX 250 W HCPCS SELF ADMINISTERED DRUGS (ALT 637 FOR MEDICARE OP, ALT 636 FOR OP/ED): Performed by: INTERNAL MEDICINE

## 2025-01-18 PROCEDURE — 2500000004 HC RX 250 GENERAL PHARMACY W/ HCPCS (ALT 636 FOR OP/ED): Performed by: INTERNAL MEDICINE

## 2025-01-18 RX ORDER — AMLODIPINE BESYLATE 10 MG/1
10 TABLET ORAL DAILY
Status: DISCONTINUED | OUTPATIENT
Start: 2025-01-19 | End: 2025-01-19 | Stop reason: HOSPADM

## 2025-01-18 RX ADMIN — METOPROLOL SUCCINATE 25 MG: 25 TABLET, EXTENDED RELEASE ORAL at 08:11

## 2025-01-18 RX ADMIN — AMLODIPINE BESYLATE 5 MG: 5 TABLET ORAL at 08:11

## 2025-01-18 RX ADMIN — VALSARTAN 320 MG: 80 TABLET, FILM COATED ORAL at 08:11

## 2025-01-18 RX ADMIN — QUETIAPINE FUMARATE 50 MG: 25 TABLET ORAL at 20:08

## 2025-01-18 RX ADMIN — ASPIRIN 81 MG: 81 TABLET, COATED ORAL at 08:11

## 2025-01-18 RX ADMIN — ATORVASTATIN CALCIUM 40 MG: 40 TABLET, FILM COATED ORAL at 20:08

## 2025-01-18 RX ADMIN — PANTOPRAZOLE SODIUM 40 MG: 40 TABLET, DELAYED RELEASE ORAL at 06:34

## 2025-01-18 RX ADMIN — ESCITALOPRAM OXALATE 20 MG: 10 TABLET ORAL at 08:11

## 2025-01-18 RX ADMIN — ENOXAPARIN SODIUM 40 MG: 40 INJECTION SUBCUTANEOUS at 14:06

## 2025-01-18 RX ADMIN — CYANOCOBALAMIN 1000 MCG: 1000 INJECTION, SOLUTION INTRAMUSCULAR; SUBCUTANEOUS at 08:11

## 2025-01-18 RX ADMIN — CLOPIDOGREL 75 MG: 75 TABLET ORAL at 08:11

## 2025-01-18 ASSESSMENT — COGNITIVE AND FUNCTIONAL STATUS - GENERAL
CLIMB 3 TO 5 STEPS WITH RAILING: A LITTLE
TOILETING: A LITTLE
MOBILITY SCORE: 22
WALKING IN HOSPITAL ROOM: A LITTLE
DAILY ACTIVITIY SCORE: 23
WALKING IN HOSPITAL ROOM: A LITTLE
TOILETING: A LITTLE
CLIMB 3 TO 5 STEPS WITH RAILING: A LITTLE
MOBILITY SCORE: 22
DAILY ACTIVITIY SCORE: 23

## 2025-01-18 ASSESSMENT — PAIN - FUNCTIONAL ASSESSMENT
PAIN_FUNCTIONAL_ASSESSMENT: 0-10
PAIN_FUNCTIONAL_ASSESSMENT: 0-10

## 2025-01-18 ASSESSMENT — PAIN SCALES - GENERAL
PAINLEVEL_OUTOF10: 0 - NO PAIN
PAINLEVEL_OUTOF10: 0 - NO PAIN

## 2025-01-18 NOTE — CARE PLAN
Problem: Nutrition  Goal: Oral intake greater 75%  Outcome: Progressing  Goal: Consume prescribed supplement  Outcome: Progressing  Goal: Adequate PO fluid intake  Outcome: Progressing  Goal: BG  mg/dL  Outcome: Progressing  Goal: Lab values WNL  Outcome: Progressing  Goal: Electrolytes WNL  Outcome: Progressing  Goal: Maintain stable weight  Outcome: Progressing     Problem: Pain - Adult  Goal: Verbalizes/displays adequate comfort level or baseline comfort level  Outcome: Progressing     Problem: Safety - Adult  Goal: Free from fall injury  Outcome: Progressing     Problem: Discharge Planning  Goal: Discharge to home or other facility with appropriate resources  Outcome: Progressing     Problem: Chronic Conditions and Co-morbidities  Goal: Patient's chronic conditions and co-morbidity symptoms are monitored and maintained or improved  Outcome: Progressing       The clinical goals for the shift include patient will remain free from falls or injuries throughout this shift.

## 2025-01-18 NOTE — CARE PLAN
The patient's goals for the shift include      The clinical goals for the shift include pt will remain free from falls or injury throughout the shift    Over the shift, the patient did not make progress toward the following goals. Barriers to progression include . Recommendations to address these barriers include   Problem: Nutrition  Goal: Oral intake greater 75%  Outcome: Progressing  Goal: Consume prescribed supplement  Outcome: Progressing  Goal: Adequate PO fluid intake  Outcome: Progressing  Goal: BG  mg/dL  Outcome: Progressing  Goal: Lab values WNL  Outcome: Progressing  Goal: Electrolytes WNL  Outcome: Progressing  Goal: Maintain stable weight  Outcome: Progressing     Problem: Pain - Adult  Goal: Verbalizes/displays adequate comfort level or baseline comfort level  Outcome: Progressing     Problem: Safety - Adult  Goal: Free from fall injury  Outcome: Progressing     Problem: Discharge Planning  Goal: Discharge to home or other facility with appropriate resources  Outcome: Progressing     Problem: Chronic Conditions and Co-morbidities  Goal: Patient's chronic conditions and co-morbidity symptoms are monitored and maintained or improved  Outcome: Progressing   .

## 2025-01-18 NOTE — PROGRESS NOTES
"Cheng De Jesus \"Luther\" is a 64 y.o. male on day 2 of admission presenting with Altered mental status, unspecified.    Subjective   Cheng De Jesus \"Luther\" is a 64 y.o. male with PMHx s/f HTN presenting with altered mental status.  He was discharged 2 days ago for transient altered mental status and hypertensive emergency. His wife states he was fine on the day of discharge and this morning. The episode is similar to how he present to the hospital 2 days ago.  He did his normal morning routine but started having dizziness like the room is spinning and near syncope.  His wife reports that his anxiety has been high since his mom has been hospitalized. He became really confused and repeating that he has to see his mom at the hospital.  His mom was discharged yesterday. He referred that bus (ambulance) took him to the hospital to see his mom and kept repeating \"she's upstairs in room 2022, I have to go see her now\". He was reportedly agitated and became verbally violent on arrival to ED. His wife notes he took his blood pressure medications this morning. He had 2 episodes of nausea and NBNB emesis and diarrhea. He was diagnosed with Influenza A 2 days ago. Denies f/c, syncope, lateralizing or focal weakness, chest pain, shortness of breath, abdominal pain.     ED Course (Summary - please note all labs, imaging studies, and interventions noted below have been personally reviewed and/or interpreted on day of admission):   Vitals on presentation: 97.7, 56 bpm, 18 RR, 207/110, 100% on RA  Labs: CMP-glucose 103, potassium 2.9, calcium 5.5, BUN 2.4  CBC largely unremarkable  Cannabinoid positive  EKG: Sinus rhythm at 59 bpm, no acute ischemic changes  Imaging: CT head-no acute intracranial pathologic findings  CXR-no acute chest finding  Interventions: Ativan 1 mg, Geodon 10 mg, admission for further management     12-point ROS reviewed and found to be negative aside from aforementioned positives in HPI and/or noted in dedicated ROS " "section below.     1/16: Per his nurse and wife his mentation was initially improved upon waking but as the morning went on he became more confused. He responded well to benzos yesterday so I tried them again today but they didn't help. He eventually required IM Zypreza.     1/17: Patient was apparently lucid enough to refuse Seroquel last night. This AM he was initially mentating normally (and had no recollection of refusing anything) but then as the day went on he again became delirious.     1/18: Patient was mentating normally on interview this AM.            Objective     Constitutional: Pleasant and cooperative. Laying in bed in no acute distress. Conversant.   Skin: Warm and dry; no obvious lesions, rashes, pallor, or jaundice.   Eyes: EOMI. Anicteric sclera.   ENT: Mucous membranes moist; no obvious injury or deformity appreciated.   Head and Neck: Normocephalic, atraumatic. ROM preserved. Trachea midline. No appreciable JVD.   Respiratory: Nonlabored on RA. Lungs clear to auscultation bilaterally without obvious adventitious sounds. Chest rise is equal.  Cardiovascular: RRR. No gross murmur, gallop, or rub. Extremities are warm and well-perfused with good capillary refill (< 3 seconds). No chest wall tenderness.   GI: Abdomen soft, nontender, nondistended. No obvious organomegaly appreciated. Bowel sounds are present.  : No CVA tenderness.   MSK: No gross abnormalities appreciated. No limitations to AROM/PROM appreciated.   Extremities: No cyanosis, edema, or clubbing evident. Neurovascularly intact.   Neuro: A&Ox3,  No facial asymmetry, no obvious dysarthria or aphasia noted.  Sensory intact.  No gaze palsy.  Strength 5 out of 5 in bilateral upper and lower extremities.  No motor drift.    Last Recorded Vitals  Blood pressure 160/83, pulse 58, temperature 36.5 °C (97.7 °F), temperature source Temporal, resp. rate 16, height 1.88 m (6' 2\"), weight 86.2 kg (190 lb 0.6 oz), SpO2 96%.  Intake/Output last 3 " Shifts:  I/O last 3 completed shifts:  In: 660 (7.7 mL/kg) [P.O.:660]  Out: - (0 mL/kg)   Weight: 86.2 kg     Relevant Results                              Assessment/Plan   Altered mental status (?Agitated delirium/catatonia)  -influenza A positive, ? metabolic encephalopathy possibly exacerbated by B12 deficiency. Patient is having longer periods of lucidity daily. He was mentating normally on the morning of 1/18.    -CTH negative  -MRI: no acute changes; mild scattered FLAIR hyperintense foci in subcortical and deep white matter of bilateral frontal lobes, which may represent microangiopathic disease versus sequela of migraines. MRA without acute findings.   -EEG normal  -Trial at bedtime Seroquel   -per neuro:  Start Plavix 75 mg a day and aspirin 81 mg a day for 21 days.  After 21 days, the Plavix can be discontinued and aspirin can be continued.   -neurology consult    B12 Deficiency   -Repleting     Hypokalemia, Hypocalcemia  -replacements ordered, replenish as necessary     HTN  -Initially hypertensive at 207/110 >> /92 at admit  -continue Toprol-XL (dose decreased 2/2 bradycardia), valsartan  -Amlodipine added this admit as well      Kenneth Strickland MD PhD

## 2025-01-19 ENCOUNTER — PHARMACY VISIT (OUTPATIENT)
Dept: PHARMACY | Facility: CLINIC | Age: 65
End: 2025-01-19
Payer: COMMERCIAL

## 2025-01-19 VITALS
OXYGEN SATURATION: 96 % | HEIGHT: 74 IN | DIASTOLIC BLOOD PRESSURE: 82 MMHG | BODY MASS INDEX: 24.39 KG/M2 | RESPIRATION RATE: 17 BRPM | TEMPERATURE: 97.2 F | HEART RATE: 53 BPM | SYSTOLIC BLOOD PRESSURE: 127 MMHG | WEIGHT: 190.04 LBS

## 2025-01-19 PROBLEM — R41.82 ALTERED MENTAL STATUS, UNSPECIFIED: Status: RESOLVED | Noted: 2025-01-15 | Resolved: 2025-01-19

## 2025-01-19 PROBLEM — R41.82 ALTERED MENTAL STATUS, UNSPECIFIED ALTERED MENTAL STATUS TYPE: Status: RESOLVED | Noted: 2025-01-16 | Resolved: 2025-01-19

## 2025-01-19 PROBLEM — E53.8 B12 DEFICIENCY: Status: ACTIVE | Noted: 2025-01-19

## 2025-01-19 LAB — METHYLMALONATE SERPL-SCNC: 0.15 UMOL/L (ref 0–0.4)

## 2025-01-19 PROCEDURE — 2500000001 HC RX 250 WO HCPCS SELF ADMINISTERED DRUGS (ALT 637 FOR MEDICARE OP)

## 2025-01-19 PROCEDURE — RXMED WILLOW AMBULATORY MEDICATION CHARGE

## 2025-01-19 PROCEDURE — 2500000001 HC RX 250 WO HCPCS SELF ADMINISTERED DRUGS (ALT 637 FOR MEDICARE OP): Performed by: INTERNAL MEDICINE

## 2025-01-19 PROCEDURE — 2500000001 HC RX 250 WO HCPCS SELF ADMINISTERED DRUGS (ALT 637 FOR MEDICARE OP): Performed by: NURSE PRACTITIONER

## 2025-01-19 PROCEDURE — 99239 HOSP IP/OBS DSCHRG MGMT >30: CPT | Performed by: INTERNAL MEDICINE

## 2025-01-19 PROCEDURE — 2500000004 HC RX 250 GENERAL PHARMACY W/ HCPCS (ALT 636 FOR OP/ED): Performed by: INTERNAL MEDICINE

## 2025-01-19 RX ORDER — ATORVASTATIN CALCIUM 40 MG/1
40 TABLET, FILM COATED ORAL NIGHTLY
Qty: 30 TABLET | Refills: 11 | Status: SHIPPED | OUTPATIENT
Start: 2025-01-19 | End: 2026-01-19

## 2025-01-19 RX ORDER — METOPROLOL SUCCINATE 25 MG/1
25 TABLET, EXTENDED RELEASE ORAL DAILY
Qty: 30 TABLET | Refills: 11 | Status: SHIPPED | OUTPATIENT
Start: 2025-01-20 | End: 2026-01-20

## 2025-01-19 RX ORDER — CLOPIDOGREL BISULFATE 75 MG/1
75 TABLET ORAL DAILY
Qty: 18 TABLET | Refills: 0 | Status: SHIPPED | OUTPATIENT
Start: 2025-01-20 | End: 2025-02-07

## 2025-01-19 RX ORDER — ASPIRIN 81 MG/1
81 TABLET ORAL DAILY
Qty: 30 TABLET | Refills: 11 | Status: SHIPPED | OUTPATIENT
Start: 2025-01-20 | End: 2026-01-20

## 2025-01-19 RX ORDER — LANOLIN ALCOHOL/MO/W.PET/CERES
1000 CREAM (GRAM) TOPICAL DAILY
Qty: 30 TABLET | Refills: 11 | Status: SHIPPED | OUTPATIENT
Start: 2025-01-19 | End: 2026-01-19

## 2025-01-19 RX ORDER — AMLODIPINE BESYLATE 10 MG/1
10 TABLET ORAL DAILY
Qty: 30 TABLET | Refills: 11 | Status: SHIPPED | OUTPATIENT
Start: 2025-01-20 | End: 2026-01-20

## 2025-01-19 RX ADMIN — CLOPIDOGREL 75 MG: 75 TABLET ORAL at 08:15

## 2025-01-19 RX ADMIN — METOPROLOL SUCCINATE 25 MG: 25 TABLET, EXTENDED RELEASE ORAL at 08:16

## 2025-01-19 RX ADMIN — PANTOPRAZOLE SODIUM 40 MG: 40 TABLET, DELAYED RELEASE ORAL at 05:57

## 2025-01-19 RX ADMIN — ESCITALOPRAM OXALATE 20 MG: 10 TABLET ORAL at 08:15

## 2025-01-19 RX ADMIN — ASPIRIN 81 MG: 81 TABLET, COATED ORAL at 08:15

## 2025-01-19 RX ADMIN — AMLODIPINE BESYLATE 10 MG: 10 TABLET ORAL at 08:15

## 2025-01-19 RX ADMIN — VALSARTAN 320 MG: 80 TABLET, FILM COATED ORAL at 08:15

## 2025-01-19 RX ADMIN — CYANOCOBALAMIN 1000 MCG: 1000 INJECTION, SOLUTION INTRAMUSCULAR; SUBCUTANEOUS at 08:16

## 2025-01-19 ASSESSMENT — PAIN - FUNCTIONAL ASSESSMENT: PAIN_FUNCTIONAL_ASSESSMENT: 0-10

## 2025-01-19 ASSESSMENT — COGNITIVE AND FUNCTIONAL STATUS - GENERAL
TOILETING: A LITTLE
CLIMB 3 TO 5 STEPS WITH RAILING: A LITTLE
DAILY ACTIVITIY SCORE: 23
WALKING IN HOSPITAL ROOM: A LITTLE
MOBILITY SCORE: 22

## 2025-01-19 ASSESSMENT — PAIN SCALES - GENERAL: PAINLEVEL_OUTOF10: 0 - NO PAIN

## 2025-01-19 NOTE — DISCHARGE INSTRUCTIONS
I believe your symptoms were likely representative of an influenza infection in the setting of B12 deficiency. However, the neurology team believe the possibility remains that this was a mini stroke (TIA). To this end, they have started three medications to prevent future TIAs/strokes. Baby aspirin and atorvastatin will be lifelong medications. Plavix will be for 21 days total (including what you got in the hospital). You were noted to have a B12 deficiency this admission. B12 is responsible for maintaining the health of your nerves. We gave ou large doses while you were in the hospital and will continue a pill supplement (called cyanocobalamin) on discharge. Please ask your PCP to recheck your B12 levels in a couple of months. Your heart rate was at times low while admitted. TO help wit hthis we have reduced your metoprolol dose to 25 mg once daily. Lastly, your blood pressure remained above goal this admission. I have started a new blood pressure medication called amlodipine for this. The most common adverse effect of this medication is leg swelling. This is not dangerous but if its bothering you please discuss switching to another medication with your PCP.

## 2025-01-19 NOTE — DISCHARGE SUMMARY
"DISCHARGE SUMMARY     Discharge Diagnosis  B12 deficiency    This discharge took greater than 35 minutes.    Test Results Pending At Discharge  Pending Labs       Order Current Status    Methylmalonic Acid In process            Hospital Course   Cheng De Jesus \"Luther\" is a 64 y.o. male with PMHx s/f HTN presenting with altered mental status.  He was discharged 2 days ago for transient altered mental status and hypertensive emergency. His wife states he was fine on the day of discharge and this morning. The episode is similar to how he present to the hospital 2 days ago.  He did his normal morning routine but started having dizziness like the room is spinning and near syncope.  His wife reports that his anxiety has been high since his mom has been hospitalized. He became really confused and repeating that he has to see his mom at the hospital.  His mom was discharged yesterday. He referred that bus (ambulance) took him to the hospital to see his mom and kept repeating \"she's upstairs in room 2022, I have to go see her now\". He was reportedly agitated and became verbally violent on arrival to ED. His wife notes he took his blood pressure medications this morning. He had 2 episodes of nausea and NBNB emesis and diarrhea. He was diagnosed with Influenza A 2 days ago. Denies f/c, syncope, lateralizing or focal weakness, chest pain, shortness of breath, abdominal pain.     ED Course (Summary - please note all labs, imaging studies, and interventions noted below have been personally reviewed and/or interpreted on day of admission):   Vitals on presentation: 97.7, 56 bpm, 18 RR, 207/110, 100% on RA  Labs: CMP-glucose 103, potassium 2.9, calcium 5.5, BUN 2.4  CBC largely unremarkable  Cannabinoid positive  EKG: Sinus rhythm at 59 bpm, no acute ischemic changes  Imaging: CT head-no acute intracranial pathologic findings  CXR-no acute chest finding  Interventions: Ativan 1 mg, Geodon 10 mg, admission for further management   "   12-point ROS reviewed and found to be negative aside from aforementioned positives in HPI and/or noted in dedicated ROS section below.      1/16: Per his nurse and wife his mentation was initially improved upon waking but as the morning went on he became more confused. He responded well to benzos yesterday so I tried them again today but they didn't help. He eventually required IM Zypreza.      1/17: Patient was apparently lucid enough to refuse Seroquel last night. This AM he was initially mentating normally (and had no recollection of refusing anything) but then as the day went on he again became delirious.      1/18: Patient was mentating normally on interview this AM.     Altered mental status (?Agitated delirium/catatonia)  -influenza A positive, ? metabolic encephalopathy possibly exacerbated by B12 deficiency. -Patient began persistently mentating normally on the morning og 1/18 and mentation remained normal until DC.   -CTH negative  -MRI: no acute changes; mild scattered FLAIR hyperintense foci in subcortical and deep white matter of bilateral frontal lobes, which may represent microangiopathic disease versus sequela of migraines. MRA without acute findings.   -EEG normal  -Trial at bedtime Seroquel   -per neuro:  Start Plavix 75 mg a day and aspirin 81 mg a day for 21 days.  After 21 days, the Plavix can be discontinued and aspirin can be continued.   -neurology consult     B12 Deficiency   -IM/SubQ repletion IP   -PO supplementation as OP     Hypokalemia, Hypocalcemia  -repleted     HTN  -Initially hypertensive at 207/110 >> /92 at admit  -continue Toprol-XL (dose decreased 2/2 bradycardia), valsartan  -Amlodipine added this admit as well    Pertinent Physical Exam At Time of Discharge  Constitutional: Pleasant and cooperative. Laying in bed in no acute distress. Conversant.   Skin: Warm and dry; no obvious lesions, rashes, pallor, or jaundice.   Eyes: EOMI. Anicteric sclera.   ENT: Mucous  membranes moist; no obvious injury or deformity appreciated.   Head and Neck: Normocephalic, atraumatic. ROM preserved. Trachea midline. No appreciable JVD.   Respiratory: Nonlabored on RA. Lungs clear to auscultation bilaterally without obvious adventitious sounds. Chest rise is equal.  Cardiovascular: RRR. No gross murmur, gallop, or rub. Extremities are warm and well-perfused with good capillary refill (< 3 seconds). No chest wall tenderness.   GI: Abdomen soft, nontender, nondistended. No obvious organomegaly appreciated. Bowel sounds are present.  : No CVA tenderness.   MSK: No gross abnormalities appreciated. No limitations to AROM/PROM appreciated.   Extremities: No cyanosis, edema, or clubbing evident. Neurovascularly intact.   Neuro: A&Ox3,  No facial asymmetry, no obvious dysarthria or aphasia noted.  Sensory intact.  No gaze palsy.  Strength 5 out of 5 in bilateral upper and lower extremities.  No motor drift.       Home Medications     Medication List      START taking these medications     amLODIPine 10 mg tablet; Commonly known as: Norvasc; Take 1 tablet (10   mg) by mouth once daily.; Start taking on: January 20, 2025   aspirin 81 mg EC tablet; Take 1 tablet (81 mg) by mouth once daily.;   Start taking on: January 20, 2025   atorvastatin 40 mg tablet; Commonly known as: Lipitor; Take 1 tablet (40   mg) by mouth once daily at bedtime.   clopidogrel 75 mg tablet; Commonly known as: Plavix; Take 1 tablet (75   mg) by mouth once daily for 18 days.; Start taking on: January 20, 2025   cyanocobalamin 1,000 mcg tablet; Commonly known as: Vitamin B-12; Take 1   tablet (1,000 mcg) by mouth once daily.     CHANGE how you take these medications     metoprolol succinate XL 25 mg 24 hr tablet; Commonly known as:   Toprol-XL; Take 1 tablet (25 mg) by mouth once daily. Do not crush or   chew.; Start taking on: January 20, 2025; What changed: medication   strength, how much to take, additional instructions      CONTINUE taking these medications     escitalopram 20 mg tablet; Commonly known as: Lexapro   valsartan 320 mg tablet; Commonly known as: Diovan       Outpatient Follow-Up  No follow-ups on file.     Kenneth Strickland MD PhD  1/19/2025  9:19 AM

## 2025-01-19 NOTE — CARE PLAN
The patient's goals for the shift include      The clinical goals for the shift include pt will remain free from fall or injury throughout the shift    Over the shift, the patient did not make progress toward the following goals. Barriers to progression include . Recommendations to address these barriers include   Problem: Nutrition  Goal: Oral intake greater 75%  Outcome: Progressing  Goal: Consume prescribed supplement  Outcome: Progressing  Goal: Adequate PO fluid intake  Outcome: Progressing  Goal: BG  mg/dL  Outcome: Progressing  Goal: Lab values WNL  Outcome: Progressing  Goal: Electrolytes WNL  Outcome: Progressing  Goal: Maintain stable weight  Outcome: Progressing     Problem: Pain - Adult  Goal: Verbalizes/displays adequate comfort level or baseline comfort level  Outcome: Progressing     Problem: Safety - Adult  Goal: Free from fall injury  Outcome: Progressing     Problem: Discharge Planning  Goal: Discharge to home or other facility with appropriate resources  Outcome: Progressing     Problem: Chronic Conditions and Co-morbidities  Goal: Patient's chronic conditions and co-morbidity symptoms are monitored and maintained or improved  Outcome: Progressing   .

## 2025-01-29 ENCOUNTER — HOSPITAL ENCOUNTER (EMERGENCY)
Facility: HOSPITAL | Age: 65
Discharge: HOME | End: 2025-01-30
Attending: EMERGENCY MEDICINE
Payer: COMMERCIAL

## 2025-01-29 ENCOUNTER — APPOINTMENT (OUTPATIENT)
Dept: RADIOLOGY | Facility: HOSPITAL | Age: 65
End: 2025-01-29
Payer: COMMERCIAL

## 2025-01-29 DIAGNOSIS — F10.921: ICD-10-CM

## 2025-01-29 DIAGNOSIS — F10.10 ALCOHOL ABUSE: Primary | ICD-10-CM

## 2025-01-29 LAB
ALBUMIN SERPL BCP-MCNC: 4.3 G/DL (ref 3.4–5)
ALP SERPL-CCNC: 52 U/L (ref 33–136)
ALT SERPL W P-5'-P-CCNC: 29 U/L (ref 10–52)
ANION GAP SERPL CALC-SCNC: 13 MMOL/L (ref 10–20)
APPEARANCE UR: CLEAR
AST SERPL W P-5'-P-CCNC: 23 U/L (ref 9–39)
BASOPHILS # BLD AUTO: 0.02 X10*3/UL (ref 0–0.1)
BASOPHILS NFR BLD AUTO: 0.2 %
BILIRUB SERPL-MCNC: 0.6 MG/DL (ref 0–1.2)
BILIRUB UR STRIP.AUTO-MCNC: NEGATIVE MG/DL
BUN SERPL-MCNC: 11 MG/DL (ref 6–23)
CA-I BLD-SCNC: 1.15 MMOL/L (ref 1.1–1.33)
CALCIUM SERPL-MCNC: 9 MG/DL (ref 8.6–10.3)
CHLORIDE SERPL-SCNC: 106 MMOL/L (ref 98–107)
CO2 SERPL-SCNC: 26 MMOL/L (ref 21–32)
COLOR UR: COLORLESS
CREAT SERPL-MCNC: 0.7 MG/DL (ref 0.5–1.3)
EGFRCR SERPLBLD CKD-EPI 2021: >90 ML/MIN/1.73M*2
EOSINOPHIL # BLD AUTO: 0.08 X10*3/UL (ref 0–0.7)
EOSINOPHIL NFR BLD AUTO: 1 %
ERYTHROCYTE [DISTWIDTH] IN BLOOD BY AUTOMATED COUNT: 13.4 % (ref 11.5–14.5)
ETHANOL SERPL-MCNC: 194 MG/DL
GLUCOSE BLD MANUAL STRIP-MCNC: 161 MG/DL (ref 74–99)
GLUCOSE SERPL-MCNC: 110 MG/DL (ref 74–99)
GLUCOSE UR STRIP.AUTO-MCNC: NORMAL MG/DL
HCT VFR BLD AUTO: 41.7 % (ref 41–52)
HGB BLD-MCNC: 14.1 G/DL (ref 13.5–17.5)
IMM GRANULOCYTES # BLD AUTO: 0.02 X10*3/UL (ref 0–0.7)
IMM GRANULOCYTES NFR BLD AUTO: 0.2 % (ref 0–0.9)
KETONES UR STRIP.AUTO-MCNC: NEGATIVE MG/DL
LEUKOCYTE ESTERASE UR QL STRIP.AUTO: NEGATIVE
LYMPHOCYTES # BLD AUTO: 1.82 X10*3/UL (ref 1.2–4.8)
LYMPHOCYTES NFR BLD AUTO: 22.2 %
MAGNESIUM SERPL-MCNC: 1.97 MG/DL (ref 1.6–2.4)
MCH RBC QN AUTO: 30.5 PG (ref 26–34)
MCHC RBC AUTO-ENTMCNC: 33.8 G/DL (ref 32–36)
MCV RBC AUTO: 90 FL (ref 80–100)
MONOCYTES # BLD AUTO: 0.57 X10*3/UL (ref 0.1–1)
MONOCYTES NFR BLD AUTO: 6.9 %
NEUTROPHILS # BLD AUTO: 5.7 X10*3/UL (ref 1.2–7.7)
NEUTROPHILS NFR BLD AUTO: 69.5 %
NITRITE UR QL STRIP.AUTO: NEGATIVE
NRBC BLD-RTO: 0 /100 WBCS (ref 0–0)
PH UR STRIP.AUTO: 5.5 [PH]
PHOSPHATE SERPL-MCNC: 2.4 MG/DL (ref 2.5–4.9)
PLATELET # BLD AUTO: 257 X10*3/UL (ref 150–450)
POTASSIUM SERPL-SCNC: 3.7 MMOL/L (ref 3.5–5.3)
PROT SERPL-MCNC: 7 G/DL (ref 6.4–8.2)
PROT UR STRIP.AUTO-MCNC: NEGATIVE MG/DL
RBC # BLD AUTO: 4.63 X10*6/UL (ref 4.5–5.9)
RBC # UR STRIP.AUTO: NEGATIVE /UL
RBC MORPH BLD: NORMAL
SODIUM SERPL-SCNC: 141 MMOL/L (ref 136–145)
SP GR UR STRIP.AUTO: 1
UROBILINOGEN UR STRIP.AUTO-MCNC: NORMAL MG/DL
WBC # BLD AUTO: 8.2 X10*3/UL (ref 4.4–11.3)

## 2025-01-29 PROCEDURE — 83735 ASSAY OF MAGNESIUM: CPT | Performed by: EMERGENCY MEDICINE

## 2025-01-29 PROCEDURE — 99284 EMERGENCY DEPT VISIT MOD MDM: CPT | Mod: 25

## 2025-01-29 PROCEDURE — 82330 ASSAY OF CALCIUM: CPT | Performed by: EMERGENCY MEDICINE

## 2025-01-29 PROCEDURE — 81003 URINALYSIS AUTO W/O SCOPE: CPT | Performed by: EMERGENCY MEDICINE

## 2025-01-29 PROCEDURE — 2500000004 HC RX 250 GENERAL PHARMACY W/ HCPCS (ALT 636 FOR OP/ED): Performed by: EMERGENCY MEDICINE

## 2025-01-29 PROCEDURE — 96374 THER/PROPH/DIAG INJ IV PUSH: CPT

## 2025-01-29 PROCEDURE — 82947 ASSAY GLUCOSE BLOOD QUANT: CPT | Mod: 59

## 2025-01-29 PROCEDURE — 70450 CT HEAD/BRAIN W/O DYE: CPT

## 2025-01-29 PROCEDURE — 82947 ASSAY GLUCOSE BLOOD QUANT: CPT

## 2025-01-29 PROCEDURE — 82607 VITAMIN B-12: CPT | Mod: PORLAB | Performed by: EMERGENCY MEDICINE

## 2025-01-29 PROCEDURE — 82077 ASSAY SPEC XCP UR&BREATH IA: CPT | Performed by: EMERGENCY MEDICINE

## 2025-01-29 PROCEDURE — 84100 ASSAY OF PHOSPHORUS: CPT | Performed by: EMERGENCY MEDICINE

## 2025-01-29 PROCEDURE — 70450 CT HEAD/BRAIN W/O DYE: CPT | Performed by: STUDENT IN AN ORGANIZED HEALTH CARE EDUCATION/TRAINING PROGRAM

## 2025-01-29 PROCEDURE — 96361 HYDRATE IV INFUSION ADD-ON: CPT

## 2025-01-29 PROCEDURE — 85025 COMPLETE CBC W/AUTO DIFF WBC: CPT | Performed by: EMERGENCY MEDICINE

## 2025-01-29 PROCEDURE — 36415 COLL VENOUS BLD VENIPUNCTURE: CPT | Performed by: EMERGENCY MEDICINE

## 2025-01-29 PROCEDURE — 80069 RENAL FUNCTION PANEL: CPT | Performed by: EMERGENCY MEDICINE

## 2025-01-29 RX ORDER — MULTIVIT-MIN/IRON FUM/FOLIC AC 7.5 MG-4
1 TABLET ORAL DAILY
Status: DISCONTINUED | OUTPATIENT
Start: 2025-01-30 | End: 2025-01-30 | Stop reason: HOSPADM

## 2025-01-29 RX ORDER — FOLIC ACID 1 MG/1
1 TABLET ORAL DAILY
Status: DISCONTINUED | OUTPATIENT
Start: 2025-01-30 | End: 2025-01-30 | Stop reason: HOSPADM

## 2025-01-29 RX ORDER — LORAZEPAM 2 MG/ML
2 INJECTION INTRAMUSCULAR ONCE
Status: COMPLETED | OUTPATIENT
Start: 2025-01-29 | End: 2025-01-29

## 2025-01-29 RX ORDER — LANOLIN ALCOHOL/MO/W.PET/CERES
100 CREAM (GRAM) TOPICAL ONCE
Status: COMPLETED | OUTPATIENT
Start: 2025-01-29 | End: 2025-01-30

## 2025-01-29 RX ADMIN — LORAZEPAM 2 MG: 2 INJECTION INTRAMUSCULAR; INTRAVENOUS at 18:52

## 2025-01-29 RX ADMIN — SODIUM CHLORIDE 1000 ML: 9 INJECTION, SOLUTION INTRAVENOUS at 22:27

## 2025-01-29 ASSESSMENT — COLUMBIA-SUICIDE SEVERITY RATING SCALE - C-SSRS
2. HAVE YOU ACTUALLY HAD ANY THOUGHTS OF KILLING YOURSELF?: NO
1. IN THE PAST MONTH, HAVE YOU WISHED YOU WERE DEAD OR WISHED YOU COULD GO TO SLEEP AND NOT WAKE UP?: NO
6. HAVE YOU EVER DONE ANYTHING, STARTED TO DO ANYTHING, OR PREPARED TO DO ANYTHING TO END YOUR LIFE?: NO

## 2025-01-29 ASSESSMENT — PAIN SCALES - GENERAL: PAINLEVEL_OUTOF10: 6

## 2025-01-29 ASSESSMENT — PAIN - FUNCTIONAL ASSESSMENT: PAIN_FUNCTIONAL_ASSESSMENT: 0-10

## 2025-01-29 NOTE — ED PROVIDER NOTES
HPI   Chief Complaint   Patient presents with    Altered Mental Status    Headache       Patient presents emergency department for altered mental status.  Family reports that this is the patient's third visit for similar symptoms.  Patient was found to have electrolyte abnormalities including low B12, hypocalcemia and hypokalemia in the past requiring repletion and hospitalization.  Patient is alert and oriented only to name and date of birth.  This is abnormal for the patient.  Family member at bedside states that this is not how he acts.  They believe he has not been taking his medications for the last 2 days.  They get a give him his home Seroquel prior to calling EMS however it does not cause any improvement in the patient's agitation.              Patient History   Past Medical History:   Diagnosis Date    Cancer (Multi)     about 8 years ago, salivary gland CA    Hypertension      History reviewed. No pertinent surgical history.  No family history on file.  Social History     Tobacco Use    Smoking status: Every Day     Current packs/day: 0.50     Types: Cigarettes    Smokeless tobacco: Never   Substance Use Topics    Alcohol use: Yes     Comment: weekends    Drug use: Never       Physical Exam   ED Triage Vitals [01/29/25 1806]   Temperature Heart Rate Respirations BP   36.8 °C (98.2 °F) 66 18 153/86      Pulse Ox Temp Source Heart Rate Source Patient Position   94 % Temporal Monitor --      BP Location FiO2 (%)     -- --       Physical Exam      ED Course & MDM   ED Course as of 01/29/25 2207 Wed Jan 29, 2025   1851 Patient presents with altered mental status and not taking his medications. Available chart reviewed. On initial assessment the patient was found agitated, toxic, acute distress, vitals hemodynamically stable and afebrile. Initial concern for electrolyte abnormalities, intracranial process, urinary tract infection, alcohol withdrawal.  Patient given 2 mg of IV Ativan, he tolerated this during  his last visit. []   1857 POCT Glucose(!): 161 []   2204 Urinalysis not concerning for infection.  Ionized calcium is normal at 1.15.  Phosphorus is slightly low at 2.4.  Magnesium is normal at 1.97.  CBC shows no leukocytosis, no anemia, no thrombocytopenia.  Metabolic panel shows no [place] abnormalities, normal kidney and liver function []   2204 Alcohol(!): 194  Patient's alcohol level explains his behavior.  Also explained his prior electrolyte and vitamin deficiencies.  Patient reevaluated and found sleeping comfortably.  Family updated.  They do state he has a history of sticking his alcohol.    No indication for admission.  Discussed findings and diagnosis with the patient, follow-up and return to ED precautions given, patient voiced understanding, agrees with plan, questions answered, patient was discharged in stable condition. []      ED Course User Index  [] Shaggy Marina MD         Diagnoses as of 01/29/25 2207   Alcohol abuse   Acute alcoholic intoxication with delirium (CMS-HCC)                 No data recorded     Bryan Coma Scale Score: 14 (01/29/25 1812 : Loretta Escoto RN)                           Medical Decision Making      Procedure  Procedures     Shaggy Marina MD  01/29/25 2208

## 2025-01-29 NOTE — ED TRIAGE NOTES
"Pt to ed via ems from home c/o AMS. Per pt, was shoveling driveway when he came into the house and \"wasn't acting right\". Pt was disoriented on arrival and agitated. Pt A&O to name and birthday only, doesn't know year or month. When pt asked risk screens questions, pt became progressively more agitated, yelling out, yelling for help when nurse was in room. Pt unable to be re-oriented, pt calmed down when family came to bedside.   "

## 2025-01-30 VITALS
DIASTOLIC BLOOD PRESSURE: 64 MMHG | HEIGHT: 74 IN | OXYGEN SATURATION: 95 % | BODY MASS INDEX: 25.03 KG/M2 | RESPIRATION RATE: 15 BRPM | HEART RATE: 68 BPM | WEIGHT: 195 LBS | SYSTOLIC BLOOD PRESSURE: 108 MMHG | TEMPERATURE: 98.2 F

## 2025-01-30 LAB
HOLD SPECIMEN: NORMAL
VIT B12 SERPL-MCNC: 1061 PG/ML (ref 211–911)

## 2025-01-30 PROCEDURE — 2500000001 HC RX 250 WO HCPCS SELF ADMINISTERED DRUGS (ALT 637 FOR MEDICARE OP): Performed by: EMERGENCY MEDICINE

## 2025-01-30 RX ADMIN — THIAMINE HCL TAB 100 MG 100 MG: 100 TAB at 00:01

## 2025-02-05 LAB
ATRIAL RATE: 92 BPM
P AXIS: 76 DEGREES
PR INTERVAL: 183 MS
Q ONSET: 253 MS
QRS COUNT: 15 BEATS
QRS DURATION: 147 MS
QT INTERVAL: 343 MS
QTC CALCULATION(BAZETT): 425 MS
QTC FREDERICIA: 395 MS
R AXIS: 122 DEGREES
T AXIS: 8 DEGREES
T OFFSET: 424 MS
VENTRICULAR RATE: 92 BPM

## 2025-02-09 LAB
ATRIAL RATE: 58 BPM
P AXIS: 58 DEGREES
PR INTERVAL: 195 MS
Q ONSET: 249 MS
QRS COUNT: 9 BEATS
QRS DURATION: 144 MS
QT INTERVAL: 423 MS
QTC CALCULATION(BAZETT): 419 MS
QTC FREDERICIA: 420 MS
R AXIS: 85 DEGREES
T AXIS: 62 DEGREES
T OFFSET: 461 MS
VENTRICULAR RATE: 59 BPM

## 2025-05-22 ENCOUNTER — PHARMACY VISIT (OUTPATIENT)
Dept: PHARMACY | Facility: CLINIC | Age: 65
End: 2025-05-22
Payer: COMMERCIAL

## 2025-05-22 PROCEDURE — RXMED WILLOW AMBULATORY MEDICATION CHARGE

## 2025-08-23 ENCOUNTER — PHARMACY VISIT (OUTPATIENT)
Dept: PHARMACY | Facility: CLINIC | Age: 65
End: 2025-08-23
Payer: COMMERCIAL

## 2025-08-23 PROCEDURE — RXMED WILLOW AMBULATORY MEDICATION CHARGE
